# Patient Record
Sex: MALE | Race: WHITE | Employment: FULL TIME | URBAN - METROPOLITAN AREA
[De-identification: names, ages, dates, MRNs, and addresses within clinical notes are randomized per-mention and may not be internally consistent; named-entity substitution may affect disease eponyms.]

---

## 2021-05-09 ENCOUNTER — IMMUNIZATION (OUTPATIENT)
Dept: LAB | Facility: HOSPITAL | Age: 51
End: 2021-05-09
Attending: EMERGENCY MEDICINE
Payer: OTHER GOVERNMENT

## 2021-05-09 DIAGNOSIS — Z23 NEED FOR VACCINATION: Primary | ICD-10-CM

## 2021-05-09 PROCEDURE — 0001A SARSCOV2 VAC 30MCG/0.3ML IM: CPT

## 2021-05-30 ENCOUNTER — IMMUNIZATION (OUTPATIENT)
Dept: LAB | Facility: HOSPITAL | Age: 51
End: 2021-05-30
Attending: EMERGENCY MEDICINE
Payer: OTHER GOVERNMENT

## 2021-05-30 DIAGNOSIS — Z23 NEED FOR VACCINATION: Primary | ICD-10-CM

## 2021-05-30 PROCEDURE — 0002A SARSCOV2 VAC 30MCG/0.3ML IM: CPT

## 2022-01-03 ENCOUNTER — IMMUNIZATION (OUTPATIENT)
Dept: LAB | Facility: HOSPITAL | Age: 52
End: 2022-01-03
Attending: EMERGENCY MEDICINE
Payer: OTHER GOVERNMENT

## 2022-01-03 DIAGNOSIS — Z23 NEED FOR VACCINATION: Primary | ICD-10-CM

## 2022-01-03 PROCEDURE — 0004A SARSCOV2 VAC 30MCG/0.3ML IM: CPT

## 2023-01-05 ENCOUNTER — TELEPHONE (OUTPATIENT)
Dept: INTERNAL MEDICINE CLINIC | Facility: CLINIC | Age: 53
End: 2023-01-05

## 2023-01-05 ENCOUNTER — OFFICE VISIT (OUTPATIENT)
Dept: INTERNAL MEDICINE CLINIC | Facility: CLINIC | Age: 53
End: 2023-01-05
Payer: COMMERCIAL

## 2023-01-05 VITALS
DIASTOLIC BLOOD PRESSURE: 82 MMHG | BODY MASS INDEX: 36.57 KG/M2 | SYSTOLIC BLOOD PRESSURE: 122 MMHG | OXYGEN SATURATION: 98 % | WEIGHT: 233 LBS | RESPIRATION RATE: 16 BRPM | HEIGHT: 67 IN | TEMPERATURE: 98 F | HEART RATE: 98 BPM

## 2023-01-05 DIAGNOSIS — B35.1 TOENAIL FUNGUS: ICD-10-CM

## 2023-01-05 DIAGNOSIS — Z12.5 PROSTATE CANCER SCREENING: ICD-10-CM

## 2023-01-05 DIAGNOSIS — Z13.29 THYROID DISORDER SCREEN: ICD-10-CM

## 2023-01-05 DIAGNOSIS — Z00.00 LABORATORY EXAMINATION ORDERED AS PART OF A ROUTINE GENERAL MEDICAL EXAMINATION: ICD-10-CM

## 2023-01-05 DIAGNOSIS — R06.83 SNORING: ICD-10-CM

## 2023-01-05 DIAGNOSIS — Z00.00 ANNUAL PHYSICAL EXAM: Primary | ICD-10-CM

## 2023-01-05 DIAGNOSIS — Z13.220 LIPID SCREENING: ICD-10-CM

## 2023-01-05 DIAGNOSIS — Z12.11 COLON CANCER SCREENING: ICD-10-CM

## 2023-01-05 DIAGNOSIS — M25.561 CHRONIC PAIN OF RIGHT KNEE: ICD-10-CM

## 2023-01-05 DIAGNOSIS — G89.29 CHRONIC LEFT SHOULDER PAIN: ICD-10-CM

## 2023-01-05 DIAGNOSIS — L98.9 SKIN LESION: ICD-10-CM

## 2023-01-05 DIAGNOSIS — Z13.89 SCREENING FOR GENITOURINARY CONDITION: ICD-10-CM

## 2023-01-05 DIAGNOSIS — Z23 NEED FOR TDAP VACCINATION: ICD-10-CM

## 2023-01-05 DIAGNOSIS — Z13.0 SCREENING, IRON DEFICIENCY ANEMIA: ICD-10-CM

## 2023-01-05 DIAGNOSIS — G89.29 CHRONIC PAIN OF RIGHT KNEE: ICD-10-CM

## 2023-01-05 DIAGNOSIS — M25.512 CHRONIC LEFT SHOULDER PAIN: ICD-10-CM

## 2023-01-05 PROBLEM — Z86.16 PERSONAL HISTORY OF COVID-19: Status: ACTIVE | Noted: 2023-01-05

## 2023-01-05 PROBLEM — D17.1 LIPOMA OF SKIN OF ABDOMEN: Status: ACTIVE | Noted: 2023-01-05

## 2023-01-05 PROCEDURE — 90715 TDAP VACCINE 7 YRS/> IM: CPT | Performed by: INTERNAL MEDICINE

## 2023-01-05 PROCEDURE — 3079F DIAST BP 80-89 MM HG: CPT | Performed by: INTERNAL MEDICINE

## 2023-01-05 PROCEDURE — 99386 PREV VISIT NEW AGE 40-64: CPT | Performed by: INTERNAL MEDICINE

## 2023-01-05 PROCEDURE — 3008F BODY MASS INDEX DOCD: CPT | Performed by: INTERNAL MEDICINE

## 2023-01-05 PROCEDURE — 90471 IMMUNIZATION ADMIN: CPT | Performed by: INTERNAL MEDICINE

## 2023-01-05 PROCEDURE — 99213 OFFICE O/P EST LOW 20 MIN: CPT | Performed by: INTERNAL MEDICINE

## 2023-01-05 PROCEDURE — 3074F SYST BP LT 130 MM HG: CPT | Performed by: INTERNAL MEDICINE

## 2023-01-05 NOTE — TELEPHONE ENCOUNTER
Called pharmacy to confirm this med needs a PA. Out of pocket cost is $80. With good rx is $40. Called [facility] [employer] for PA contact information and spoke with Jayme Ramirez. She is faxing additional PA paperwork to be completed. Anne Vaughan [address]  Ph: [phone]   ID #: [id number]  Dx:  Toenail fungus B35.1

## 2023-01-06 ENCOUNTER — HOSPITAL ENCOUNTER (OUTPATIENT)
Dept: GENERAL RADIOLOGY | Age: 53
Discharge: HOME | End: 2023-01-06
Attending: INTERNAL MEDICINE
Payer: COMMERCIAL

## 2023-01-06 ENCOUNTER — LAB ENCOUNTER (OUTPATIENT)
Dept: LAB | Age: 53
End: 2023-01-06
Attending: INTERNAL MEDICINE
Payer: COMMERCIAL

## 2023-01-06 DIAGNOSIS — Z13.220 LIPID SCREENING: ICD-10-CM

## 2023-01-06 DIAGNOSIS — G89.29 CHRONIC PAIN OF RIGHT KNEE: ICD-10-CM

## 2023-01-06 DIAGNOSIS — M17.11 OSTEOARTHRITIS OF RIGHT KNEE, UNSPECIFIED OSTEOARTHRITIS TYPE: Primary | ICD-10-CM

## 2023-01-06 DIAGNOSIS — Z13.29 THYROID DISORDER SCREEN: ICD-10-CM

## 2023-01-06 DIAGNOSIS — Z13.89 SCREENING FOR GENITOURINARY CONDITION: ICD-10-CM

## 2023-01-06 DIAGNOSIS — Z12.5 PROSTATE CANCER SCREENING: ICD-10-CM

## 2023-01-06 DIAGNOSIS — M25.561 CHRONIC PAIN OF RIGHT KNEE: ICD-10-CM

## 2023-01-06 DIAGNOSIS — R93.6 ABNORMAL X-RAY OF KNEE: ICD-10-CM

## 2023-01-06 DIAGNOSIS — Z13.0 SCREENING, IRON DEFICIENCY ANEMIA: ICD-10-CM

## 2023-01-06 DIAGNOSIS — Z00.00 LABORATORY EXAMINATION ORDERED AS PART OF A ROUTINE GENERAL MEDICAL EXAMINATION: ICD-10-CM

## 2023-01-06 PROBLEM — Z91.89 10 YEAR RISK OF MI OR STROKE < 7.5%: Status: ACTIVE | Noted: 2023-01-06

## 2023-01-06 LAB
ALBUMIN SERPL-MCNC: 3.7 G/DL (ref 3.4–5)
ALBUMIN/GLOB SERPL: 1.2 {RATIO} (ref 1–2)
ALP LIVER SERPL-CCNC: 58 U/L
ALT SERPL-CCNC: 63 U/L
ANION GAP SERPL CALC-SCNC: 4 MMOL/L (ref 0–18)
AST SERPL-CCNC: 32 U/L (ref 15–37)
BASOPHILS # BLD AUTO: 0.05 X10(3) UL (ref 0–0.2)
BASOPHILS NFR BLD AUTO: 0.8 %
BILIRUB SERPL-MCNC: 1.4 MG/DL (ref 0.1–2)
BILIRUB UR QL STRIP.AUTO: NEGATIVE
BUN BLD-MCNC: 18 MG/DL (ref 7–18)
CALCIUM BLD-MCNC: 9.5 MG/DL (ref 8.5–10.1)
CHLORIDE SERPL-SCNC: 107 MMOL/L (ref 98–112)
CHOLEST SERPL-MCNC: 174 MG/DL (ref ?–200)
CLARITY UR REFRACT.AUTO: CLEAR
CO2 SERPL-SCNC: 29 MMOL/L (ref 21–32)
COLOR UR AUTO: YELLOW
COMPLEXED PSA SERPL-MCNC: 0.64 NG/ML (ref ?–4)
CREAT BLD-MCNC: 1.16 MG/DL
EOSINOPHIL # BLD AUTO: 0.28 X10(3) UL (ref 0–0.7)
EOSINOPHIL NFR BLD AUTO: 4.6 %
ERYTHROCYTE [DISTWIDTH] IN BLOOD BY AUTOMATED COUNT: 12.4 %
EST. AVERAGE GLUCOSE BLD GHB EST-MCNC: 88 MG/DL (ref 68–126)
FASTING PATIENT LIPID ANSWER: YES
FASTING STATUS PATIENT QL REPORTED: YES
GFR SERPLBLD BASED ON 1.73 SQ M-ARVRAT: 76 ML/MIN/1.73M2 (ref 60–?)
GLOBULIN PLAS-MCNC: 3.1 G/DL (ref 2.8–4.4)
GLUCOSE BLD-MCNC: 85 MG/DL (ref 70–99)
GLUCOSE UR STRIP.AUTO-MCNC: NEGATIVE MG/DL
HBA1C MFR BLD: 4.7 % (ref ?–5.7)
HCT VFR BLD AUTO: 46.2 %
HDLC SERPL-MCNC: 41 MG/DL (ref 40–59)
HGB BLD-MCNC: 16.7 G/DL
IMM GRANULOCYTES # BLD AUTO: 0.02 X10(3) UL (ref 0–1)
IMM GRANULOCYTES NFR BLD: 0.3 %
KETONES UR STRIP.AUTO-MCNC: NEGATIVE MG/DL
LDLC SERPL CALC-MCNC: 107 MG/DL (ref ?–100)
LEUKOCYTE ESTERASE UR QL STRIP.AUTO: NEGATIVE
LYMPHOCYTES # BLD AUTO: 2.19 X10(3) UL (ref 1–4)
LYMPHOCYTES NFR BLD AUTO: 36.1 %
MCH RBC QN AUTO: 33.9 PG (ref 26–34)
MCHC RBC AUTO-ENTMCNC: 36.1 G/DL (ref 31–37)
MCV RBC AUTO: 93.7 FL
MONOCYTES # BLD AUTO: 0.5 X10(3) UL (ref 0.1–1)
MONOCYTES NFR BLD AUTO: 8.3 %
NEUTROPHILS # BLD AUTO: 3.02 X10 (3) UL (ref 1.5–7.7)
NEUTROPHILS # BLD AUTO: 3.02 X10(3) UL (ref 1.5–7.7)
NEUTROPHILS NFR BLD AUTO: 49.9 %
NITRITE UR QL STRIP.AUTO: NEGATIVE
NONHDLC SERPL-MCNC: 133 MG/DL (ref ?–130)
OSMOLALITY SERPL CALC.SUM OF ELEC: 291 MOSM/KG (ref 275–295)
PH UR STRIP.AUTO: 5 [PH] (ref 5–8)
PLATELET # BLD AUTO: 199 10(3)UL (ref 150–450)
POTASSIUM SERPL-SCNC: 3.9 MMOL/L (ref 3.5–5.1)
PROT SERPL-MCNC: 6.8 G/DL (ref 6.4–8.2)
PROT UR STRIP.AUTO-MCNC: NEGATIVE MG/DL
RBC # BLD AUTO: 4.93 X10(6)UL
RBC UR QL AUTO: NEGATIVE
SODIUM SERPL-SCNC: 140 MMOL/L (ref 136–145)
SP GR UR STRIP.AUTO: 1.02 (ref 1–1.03)
TRIGL SERPL-MCNC: 147 MG/DL (ref 30–149)
TSI SER-ACNC: 3.37 MIU/ML (ref 0.36–3.74)
UROBILINOGEN UR STRIP.AUTO-MCNC: <2 MG/DL
VLDLC SERPL CALC-MCNC: 25 MG/DL (ref 0–30)
WBC # BLD AUTO: 6.1 X10(3) UL (ref 4–11)

## 2023-01-06 PROCEDURE — 85025 COMPLETE CBC W/AUTO DIFF WBC: CPT

## 2023-01-06 PROCEDURE — 73560 X-RAY EXAM OF KNEE 1 OR 2: CPT | Performed by: INTERNAL MEDICINE

## 2023-01-06 PROCEDURE — 80061 LIPID PANEL: CPT

## 2023-01-06 PROCEDURE — 81003 URINALYSIS AUTO W/O SCOPE: CPT

## 2023-01-06 PROCEDURE — 83036 HEMOGLOBIN GLYCOSYLATED A1C: CPT

## 2023-01-06 PROCEDURE — 80053 COMPREHEN METABOLIC PANEL: CPT

## 2023-01-06 PROCEDURE — 36415 COLL VENOUS BLD VENIPUNCTURE: CPT

## 2023-01-06 PROCEDURE — 84443 ASSAY THYROID STIM HORMONE: CPT

## 2023-01-09 ENCOUNTER — PATIENT MESSAGE (OUTPATIENT)
Dept: INTERNAL MEDICINE CLINIC | Facility: CLINIC | Age: 53
End: 2023-01-09

## 2023-01-09 DIAGNOSIS — B35.1 TOENAIL FUNGUS: ICD-10-CM

## 2023-01-09 NOTE — TELEPHONE ENCOUNTER
Prior authorization denied. Ciclopirox (PENLAC) 8 % External Solution    Patient notified of denial. Informed patient of Good rx coupon.

## 2023-02-06 ENCOUNTER — TELEPHONE (OUTPATIENT)
Dept: ORTHOPEDICS CLINIC | Facility: CLINIC | Age: 53
End: 2023-02-06

## 2023-02-06 DIAGNOSIS — M25.561 RIGHT KNEE PAIN, UNSPECIFIED CHRONICITY: Primary | ICD-10-CM

## 2023-02-06 NOTE — TELEPHONE ENCOUNTER
Patient is coming in for Rt knee pain. Patient had imaging done,Imaging can be viewed in Epic. Please review imaging, and if further imaging is needed please place Rx .   Future Appointments   Date Time Provider Benny Rae   3/6/2023  8:00 AM Thad Samaniego MD [facility]   4/18/2023  2:30 PM Elis Rodrigues MD [facility] SUB GI

## 2023-02-07 NOTE — TELEPHONE ENCOUNTER
Appointment moved sooner as with Teresa FOREMAN as appointment is scheduled 1 month away.   New WB images ordered per Teresa Escobar

## 2023-02-09 NOTE — TELEPHONE ENCOUNTER
XR scheduled. Sent patient message via [facility] to inform them and ask them to arrive 15-20 minutes prior to appointment with Kana Navarro.

## 2023-02-14 ENCOUNTER — OFFICE VISIT (OUTPATIENT)
Dept: ORTHOPEDICS CLINIC | Facility: CLINIC | Age: 53
End: 2023-02-14
Payer: COMMERCIAL

## 2023-02-14 VITALS — HEART RATE: 73 BPM | WEIGHT: 233 LBS | BODY MASS INDEX: 36.57 KG/M2 | OXYGEN SATURATION: 97 % | HEIGHT: 67 IN

## 2023-02-14 DIAGNOSIS — M17.11 PRIMARY OSTEOARTHRITIS OF RIGHT KNEE: ICD-10-CM

## 2023-02-14 DIAGNOSIS — M25.561 RIGHT KNEE PAIN, UNSPECIFIED CHRONICITY: Primary | ICD-10-CM

## 2023-02-14 PROCEDURE — 99203 OFFICE O/P NEW LOW 30 MIN: CPT | Performed by: PHYSICIAN ASSISTANT

## 2023-02-14 PROCEDURE — 20610 DRAIN/INJ JOINT/BURSA W/O US: CPT | Performed by: PHYSICIAN ASSISTANT

## 2023-02-14 PROCEDURE — 3008F BODY MASS INDEX DOCD: CPT | Performed by: PHYSICIAN ASSISTANT

## 2023-02-14 RX ORDER — TRIAMCINOLONE ACETONIDE 40 MG/ML
40 INJECTION, SUSPENSION INTRA-ARTICULAR; INTRAMUSCULAR ONCE
Status: COMPLETED | OUTPATIENT
Start: 2023-02-14 | End: 2023-02-14

## 2023-02-14 RX ADMIN — TRIAMCINOLONE ACETONIDE 40 MG: 40 INJECTION, SUSPENSION INTRA-ARTICULAR; INTRAMUSCULAR at 09:43:00

## 2023-02-14 NOTE — PROCEDURES
After informed consent, the patient's right knee was marked, locally anesthetized with skin refrigerant, prepped with topical antiseptic, and injected with a mixture of 1mL 40mg/mL Kenalog, 2mL 1% lidocaine and 2mL 0.5% marcaine through the inferolateral portal.  A band-aid was applied. The patient tolerated the procedure well.     Gael Amin PA-C  [address] Orthopedic Surgery

## 2023-04-18 PROBLEM — D12.0 BENIGN NEOPLASM OF CECUM: Status: ACTIVE | Noted: 2023-04-18

## 2023-04-18 PROBLEM — Z12.11 SPECIAL SCREENING FOR MALIGNANT NEOPLASM OF COLON: Status: ACTIVE | Noted: 2023-04-18

## 2023-04-18 PROBLEM — D12.3 BENIGN NEOPLASM OF TRANSVERSE COLON: Status: ACTIVE | Noted: 2023-04-18

## 2023-06-21 DIAGNOSIS — B35.1 TOENAIL FUNGUS: ICD-10-CM

## 2023-06-22 NOTE — TELEPHONE ENCOUNTER
Last time medication was refilled 1/9/23  Quantity and # of refills 1 each 2 ordered  Last OV 1/5/23  Next OV no appt scheduled.     Sent to Dr. Luz Marina Acosta for approval.

## 2024-04-02 ENCOUNTER — OFFICE VISIT (OUTPATIENT)
Dept: INTERNAL MEDICINE CLINIC | Facility: CLINIC | Age: 54
End: 2024-04-02
Payer: COMMERCIAL

## 2024-04-02 ENCOUNTER — LAB ENCOUNTER (OUTPATIENT)
Dept: LAB | Age: 54
End: 2024-04-02
Attending: INTERNAL MEDICINE
Payer: COMMERCIAL

## 2024-04-02 VITALS
HEIGHT: 67 IN | OXYGEN SATURATION: 98 % | BODY MASS INDEX: 36.1 KG/M2 | SYSTOLIC BLOOD PRESSURE: 128 MMHG | HEART RATE: 78 BPM | TEMPERATURE: 98 F | DIASTOLIC BLOOD PRESSURE: 72 MMHG | RESPIRATION RATE: 16 BRPM | WEIGHT: 230 LBS

## 2024-04-02 DIAGNOSIS — M54.16 LUMBAR RADICULAR PAIN: ICD-10-CM

## 2024-04-02 DIAGNOSIS — Z00.00 ANNUAL PHYSICAL EXAM: Primary | ICD-10-CM

## 2024-04-02 DIAGNOSIS — Z00.00 ROUTINE GENERAL MEDICAL EXAMINATION AT A HEALTH CARE FACILITY: ICD-10-CM

## 2024-04-02 DIAGNOSIS — Z12.5 PROSTATE CANCER SCREENING: ICD-10-CM

## 2024-04-02 PROBLEM — D12.3 BENIGN NEOPLASM OF TRANSVERSE COLON: Status: RESOLVED | Noted: 2023-04-18 | Resolved: 2024-04-02

## 2024-04-02 PROBLEM — D12.0 BENIGN NEOPLASM OF CECUM: Status: RESOLVED | Noted: 2023-04-18 | Resolved: 2024-04-02

## 2024-04-02 PROBLEM — Z12.11 SPECIAL SCREENING FOR MALIGNANT NEOPLASM OF COLON: Status: RESOLVED | Noted: 2023-04-18 | Resolved: 2024-04-02

## 2024-04-02 LAB
ALBUMIN SERPL-MCNC: 4 G/DL (ref 3.4–5)
ALBUMIN/GLOB SERPL: 1.3 {RATIO} (ref 1–2)
ALP LIVER SERPL-CCNC: 57 U/L
ALT SERPL-CCNC: 61 U/L
ANION GAP SERPL CALC-SCNC: 5 MMOL/L (ref 0–18)
AST SERPL-CCNC: 31 U/L (ref 15–37)
BASOPHILS # BLD AUTO: 0.05 X10(3) UL (ref 0–0.2)
BASOPHILS NFR BLD AUTO: 0.8 %
BILIRUB SERPL-MCNC: 1.2 MG/DL (ref 0.1–2)
BILIRUB UR QL STRIP.AUTO: NEGATIVE
BUN BLD-MCNC: 16 MG/DL (ref 9–23)
CALCIUM BLD-MCNC: 9.7 MG/DL (ref 8.5–10.1)
CHLORIDE SERPL-SCNC: 106 MMOL/L (ref 98–112)
CHOLEST SERPL-MCNC: 204 MG/DL (ref ?–200)
CLARITY UR REFRACT.AUTO: CLEAR
CO2 SERPL-SCNC: 29 MMOL/L (ref 21–32)
COMPLEXED PSA SERPL-MCNC: 0.57 NG/ML (ref ?–4)
CREAT BLD-MCNC: 1.18 MG/DL
EGFRCR SERPLBLD CKD-EPI 2021: 74 ML/MIN/1.73M2 (ref 60–?)
EOSINOPHIL # BLD AUTO: 0.3 X10(3) UL (ref 0–0.7)
EOSINOPHIL NFR BLD AUTO: 4.9 %
ERYTHROCYTE [DISTWIDTH] IN BLOOD BY AUTOMATED COUNT: 12.2 %
FASTING PATIENT LIPID ANSWER: YES
FASTING STATUS PATIENT QL REPORTED: YES
GLOBULIN PLAS-MCNC: 3.2 G/DL (ref 2.8–4.4)
GLUCOSE BLD-MCNC: 98 MG/DL (ref 70–99)
GLUCOSE UR STRIP.AUTO-MCNC: NORMAL MG/DL
HCT VFR BLD AUTO: 47.7 %
HDLC SERPL-MCNC: 46 MG/DL (ref 40–59)
HGB BLD-MCNC: 17 G/DL
IMM GRANULOCYTES # BLD AUTO: 0.02 X10(3) UL (ref 0–1)
IMM GRANULOCYTES NFR BLD: 0.3 %
KETONES UR STRIP.AUTO-MCNC: NEGATIVE MG/DL
LDLC SERPL CALC-MCNC: 109 MG/DL (ref ?–100)
LEUKOCYTE ESTERASE UR QL STRIP.AUTO: NEGATIVE
LYMPHOCYTES # BLD AUTO: 2.08 X10(3) UL (ref 1–4)
LYMPHOCYTES NFR BLD AUTO: 33.7 %
MCH RBC QN AUTO: 32.5 PG (ref 26–34)
MCHC RBC AUTO-ENTMCNC: 35.6 G/DL (ref 31–37)
MCV RBC AUTO: 91.2 FL
MONOCYTES # BLD AUTO: 0.45 X10(3) UL (ref 0.1–1)
MONOCYTES NFR BLD AUTO: 7.3 %
NEUTROPHILS # BLD AUTO: 3.27 X10 (3) UL (ref 1.5–7.7)
NEUTROPHILS # BLD AUTO: 3.27 X10(3) UL (ref 1.5–7.7)
NEUTROPHILS NFR BLD AUTO: 53 %
NITRITE UR QL STRIP.AUTO: NEGATIVE
NONHDLC SERPL-MCNC: 158 MG/DL (ref ?–130)
OSMOLALITY SERPL CALC.SUM OF ELEC: 291 MOSM/KG (ref 275–295)
PH UR STRIP.AUTO: 5.5 [PH] (ref 5–8)
PLATELET # BLD AUTO: 201 10(3)UL (ref 150–450)
POTASSIUM SERPL-SCNC: 4.5 MMOL/L (ref 3.5–5.1)
PROT SERPL-MCNC: 7.2 G/DL (ref 6.4–8.2)
PROT UR STRIP.AUTO-MCNC: NEGATIVE MG/DL
RBC # BLD AUTO: 5.23 X10(6)UL
RBC UR QL AUTO: NEGATIVE
SODIUM SERPL-SCNC: 140 MMOL/L (ref 136–145)
SP GR UR STRIP.AUTO: 1.01 (ref 1–1.03)
TRIGL SERPL-MCNC: 287 MG/DL (ref 30–149)
TSI SER-ACNC: 3.71 MIU/ML (ref 0.36–3.74)
UROBILINOGEN UR STRIP.AUTO-MCNC: NORMAL MG/DL
VLDLC SERPL CALC-MCNC: 50 MG/DL (ref 0–30)
WBC # BLD AUTO: 6.2 X10(3) UL (ref 4–11)

## 2024-04-02 PROCEDURE — 84443 ASSAY THYROID STIM HORMONE: CPT

## 2024-04-02 PROCEDURE — 36415 COLL VENOUS BLD VENIPUNCTURE: CPT

## 2024-04-02 PROCEDURE — 80053 COMPREHEN METABOLIC PANEL: CPT

## 2024-04-02 PROCEDURE — 99396 PREV VISIT EST AGE 40-64: CPT | Performed by: INTERNAL MEDICINE

## 2024-04-02 PROCEDURE — 80061 LIPID PANEL: CPT

## 2024-04-02 PROCEDURE — 85025 COMPLETE CBC W/AUTO DIFF WBC: CPT

## 2024-04-02 PROCEDURE — 81003 URINALYSIS AUTO W/O SCOPE: CPT

## 2024-04-02 PROCEDURE — 99213 OFFICE O/P EST LOW 20 MIN: CPT | Performed by: INTERNAL MEDICINE

## 2024-04-02 NOTE — PROGRESS NOTES
Subjective:   Patient ID: Freddy Ontiveros is a 53 year old male.    HPI  Freddy Ontiveros is a 53 year old male who presents for a complete physical exam.   HPI:   Pt complains of lumbar pain with radiation to R>L leg.  Denies incontinence.  Pain is worsening  Has undergone PT and chiro care w/o relief. Has had 6 session.  Sxs occurred >6 m ago after heavy back squats      PAST MEDICAL, SOCIAL, FAMILY HISTORIES REVIEWED WITH PT  .    Immunization History   Administered Date(s) Administered    Covid-19 Vaccine Pfizer 30 mcg/0.3 ml 05/09/2021, 05/30/2021, 01/03/2022    TDAP 01/05/2023     Wt Readings from Last 6 Encounters:   04/02/24 230 lb (104.3 kg)   04/05/23 220 lb (99.8 kg)   02/14/23 233 lb (105.7 kg)   01/05/23 233 lb (105.7 kg)     Body mass index is 36.02 kg/m².     Lab Results   Component Value Date    GLU 85 01/06/2023    GLU 74 01/09/2014     Lab Results   Component Value Date    CHOLEST 174 01/06/2023    CHOLEST 180 01/09/2014     Lab Results   Component Value Date    HDL 41 01/06/2023    HDL 40 (L) 01/09/2014     Lab Results   Component Value Date     (H) 01/06/2023     01/09/2014     Lab Results   Component Value Date    AST 32 01/06/2023    AST 22 01/09/2014     Lab Results   Component Value Date    ALT 63 (H) 01/06/2023    ALT 42 01/09/2014     Lab Results   Component Value Date    PSA 0.472 01/09/2014        Current Outpatient Medications   Medication Sig Dispense Refill    Ciclopirox (PENLAC) 8 % External Solution Apply 1 Application. topically nightly. 1 each 1      Past Medical History:   Diagnosis Date    Arthritis     Wears glasses       Past Surgical History:   Procedure Laterality Date    APPENDECTOMY      TONSILLECTOMY        Family History   Problem Relation Age of Onset    Colon Polyps Father     Prostate Cancer Father     Diabetes Brother       Social History:  Social History     Socioeconomic History    Marital status:    Tobacco Use    Smoking status: Never     Smokeless tobacco: Former     Quit date: 2021   Vaping Use    Vaping Use: Never used   Substance and Sexual Activity    Alcohol use: Yes     Alcohol/week: 10.0 standard drinks of alcohol     Types: 2 Glasses of wine, 4 Cans of beer, 4 Standard drinks or equivalent per week    Drug use: Not Currently      Occ: yes. : yes. Children: yes.   Exercise: once per week,  twice per week.  Diet: watches minimally     REVIEW OF SYSTEMS:   A comprehensive 10 point review of systems was completed.     Pertinent positives and negatives noted in the HPI.      EXAM:   /72   Pulse 78   Temp 97.6 °F (36.4 °C)   Resp 16   Ht 5' 7\" (1.702 m)   Wt 230 lb (104.3 kg)   SpO2 98%   BMI 36.02 kg/m²   Body mass index is 36.02 kg/m².   GENERAL: well developed, well nourished,in no apparent distress  SKIN: no rashes,no suspicious lesions  HEENT: atraumatic, normocephalic,ears and throat are clear  EYES:PERRLA, EOMI,,conjunctiva are clear  NECK: supple,no adenopathy,no bruits  LUNGS: clear to auscultation  CARDIO: RRR without murmur  GI: good BS's,no masses, HSM or tenderness  : deferred  MUSCULOSKELETAL: back is not tender  EXTREMITIES: no cyanosis, clubbing or edema  NEURO: Oriented times three,motor and sensory are grossly intact, right patellar DTR 1+ otherwise remaining DTR LE are 2+    ASSESSMENT AND PLAN:   Freddy Ontiveros is a 53 year old male who presents for a complete physical exam. Body mass index is 36.02 kg/m²., recommended low fat diet and aerobic exercise 30 minutes three times weekly.   Health maintenance, will check fasting Lipids, CMP, CBC and PSA.  Check lumbar MRI for eval of lumbar radiculopathy  Utd wit screening colonoscopy.   Pt info handouts given for: exercise, low fat diet,   The patient indicates understanding of these issues and agrees to the plan.  The patient is asked to return for CPX in 12 m.    History/Other:   Review of Systems  Current Outpatient Medications   Medication Sig Dispense  Refill    Ciclopirox (PENLAC) 8 % External Solution Apply 1 Application. topically nightly. 1 each 1     Allergies:No Known Allergies    Objective:   Physical Exam    Assessment & Plan:   1. Annual physical exam    2. Routine general medical examination at a health care facility    3. Lumbar radicular pain    4. Prostate cancer screening        Orders Placed This Encounter   Procedures    CBC With Differential With Platelet    Comp Metabolic Panel (14)    Lipid Panel    TSH W Reflex To Free T4    Urinalysis, Routine    PSA Total, Screen       Meds This Visit:  Requested Prescriptions      No prescriptions requested or ordered in this encounter       Imaging & Referrals:  MRI SPINE LUMBAR (CPT=72148)

## 2024-04-25 ENCOUNTER — TELEPHONE (OUTPATIENT)
Dept: INTERNAL MEDICINE CLINIC | Facility: CLINIC | Age: 54
End: 2024-04-25

## 2024-04-25 NOTE — TELEPHONE ENCOUNTER
Spoke with pt, notified referral team will get prior auth approved  Advised to keep an eye on it and let us know couple days before if still not authorized  Pt v/u

## 2024-05-07 ENCOUNTER — HOSPITAL ENCOUNTER (OUTPATIENT)
Dept: MRI IMAGING | Facility: HOSPITAL | Age: 54
Discharge: HOME | End: 2024-05-07
Attending: INTERNAL MEDICINE
Payer: COMMERCIAL

## 2024-05-07 DIAGNOSIS — M54.16 LUMBAR RADICULAR PAIN: ICD-10-CM

## 2024-05-07 PROCEDURE — 72148 MRI LUMBAR SPINE W/O DYE: CPT | Performed by: INTERNAL MEDICINE

## 2024-05-15 ENCOUNTER — OFFICE VISIT (OUTPATIENT)
Dept: PAIN CLINIC | Facility: CLINIC | Age: 54
End: 2024-05-15

## 2024-05-15 ENCOUNTER — TELEPHONE (OUTPATIENT)
Dept: PAIN CLINIC | Facility: CLINIC | Age: 54
End: 2024-05-15

## 2024-05-15 VITALS — HEART RATE: 73 BPM | SYSTOLIC BLOOD PRESSURE: 118 MMHG | DIASTOLIC BLOOD PRESSURE: 86 MMHG | OXYGEN SATURATION: 98 %

## 2024-05-15 DIAGNOSIS — M48.062 SPINAL STENOSIS OF LUMBAR REGION WITH NEUROGENIC CLAUDICATION: Primary | ICD-10-CM

## 2024-05-15 DIAGNOSIS — M54.16 LUMBAR RADICULITIS: Primary | ICD-10-CM

## 2024-05-15 PROCEDURE — 99204 OFFICE O/P NEW MOD 45 MIN: CPT | Performed by: PHYSICIAN ASSISTANT

## 2024-05-15 NOTE — PROGRESS NOTES
Location of Pain: lumbar back radiating down B LE, R>L     Date Pain Began: 7/24          Work Related:   No        Receiving Work Comp/Disability:   No    Numeric Rating Scale:  Pain at Present:  2                                                                                                            (No Pain) 0  to  10 (Worst Pain)                 Minimum Pain:   2  Maximum Pain  8    Distribution of Pain:    bilateral    Quality of Pain:    Cramping    Origin of Pain:    Traumatic Accident    Aggravating Factors:    Other Extended Activity    Past Treatments for Current Pain Condition:   Physical Therapy and Other Chiro    Prior diagnostic testing for your pain:  MRI

## 2024-05-15 NOTE — PROGRESS NOTES
Patient: Freddy Ontiveros  Medical Record Number: [id number]  Site: [facility]  Referring Physician:  Riki Bourgeois  PCP: Same    Dear Dr. Bourgeois:    Thank you very much for requesting this consultation. I had the opportunity to evaluate and initiate care for your patient today, as per your request.    HISTORY OF CHIEF COMPLAINT:      Freddy Ontiveros is a 54 year old male, who complains of low back and bilateral lower extremity pain.    Patient is here today with pain in above-described distribution that is been ongoing since 7/2023.  States that he had been working out (engaging in squats) and had acute onset of pain.  States that he has pursued conservative management, to include physical therapy with focus on HEP, as well as chiropractic care.  This was partially effective, though pain has certainly continued.  He has used NSAIDs, to temporary relief.  Has been evaluated by his primary care physician, who sent him for MRI of the lumbar spine.  This did reveal moderate stenosis (see below) and was sent for consideration of further options.    VAS Pain Score:  2-8/10    Aggravating Factors: Relieving Factors:   Standing in one position  Extension  Limiting activity      Past Treatment Attempted/Patient’s Response:  As above     Past Medical History:    Arthritis    Wears glasses      Past Surgical History:   Procedure Laterality Date    Appendectomy      Tonsillectomy        Family History   Problem Relation Age of Onset    Colon Polyps Father     Prostate Cancer Father     Diabetes Brother       Social History     Socioeconomic History    Marital status:    Tobacco Use    Smoking status: Never    Smokeless tobacco: Former     Quit date: 2021   Vaping Use    Vaping status: Never Used   Substance and Sexual Activity    Alcohol use: Yes     Alcohol/week: 10.0 standard drinks of alcohol     Types: 2 Glasses of wine, 4 Cans of beer, 4 Standard drinks or equivalent per week    Drug use: Not  Currently   Other Topics Concern    Caffeine Concern No    Exercise No      Current Medications:  Current Outpatient Medications   Medication Sig Dispense Refill    Ciclopirox (PENLAC) 8 % External Solution Apply 1 Application. topically nightly. 1 each 1        Functional Assessment: Patient reports that they are able to complete all of their ADL's such as eating, bathing, using the toilet, dressing and getting up from a bed or a chair independently.    Work History:  The patient currently works full time as .       REVIEW OF SYSTEMS:   10 point review of systems is otherwise negative,unless otherwise in HPI.      Radiology/Lab Test Reviewed:  MRI L spine:    T12-L1: There is no significant abnormality.      L1-2:  Minimal diffuse disc bulge with mild facet arthropathy. There is no significant spinal canal or neural foraminal stenosis.      L2-3:  Minimal diffuse disc bulge with mild facet arthropathy.  Mild spinal canal stenosis.  No significant neural foraminal stenosis.      L3-4:  Diffuse disc bulge with endplate osteophytes.  Mild facet arthropathy.  Mild to moderate spinal canal stenosis.  Mild bilateral neural foraminal stenosis.      L4-5:  Mild diffuse disc bulge.  Mild-to-moderate facet arthropathy with thickening of ligamentum flavum.  Small facet joint effusions.  Mild to moderate spinal canal stenosis.  Mild bilateral neural foraminal stenosis.      L5-S1:  Mild facet arthropathy. There is no significant spinal canal or neural foraminal stenosis.         CBC:    Lab Results   Component Value Date    WBC 6.2 04/02/2024    WBC 6.1 01/06/2023   No results found for: \"HEMOGLOBIN\"  Lab Results   Component Value Date    .0 04/02/2024    .0 01/06/2023       PHYSICAL EXAMIMATION   PHYSICAL EXAMINATION: Freddy Ontiveros is a 54 year old male who is observed sitting comfortably on a chair in the exam room alert and oriented times three. He looks consistent with his stated age.    Ht  Readings from Last 1 Encounters:   04/02/24 67\"     Wt Readings from Last 1 Encounters:   04/02/24 230 lb (104.3 kg)     The patient is well developed, well nourished, well muscled, obese. He moves independently from sitting to standing with ease.       Inspection:  No acute distress    Patient displays Non-antalgic gait.    Coordination:  Well-coordinated, fluent gait, able to engage in rapid alternating movements of upper and lower extremities.    ROM Lumbar Spine:  See chart below:  Motion Right (+ or -) Left (+ or -)   Lumbar Flexion - -   Lumbar Extension + +   Lumbar Bending - -   Lumbar Extension/ twisting motion - -     Lumbar/Sacral Integument:  Skin over lumbar sacral spine is intact without rashes, excoriations, lesions or erythema noted    Palpation:  See chart below:  Palpation of lumbar area Right (+ or -) Left (+ or -)   Lumbar facets - -   Lumbar paraspinals - -   Piriformis - -   SIJ - -   Trochanteric Bursa - -     Deep Tendon Reflexes:  Grossly intact to bilateral lower extremities 2/4 throughout    Strength:  Strength of bilateral lower extremities grossly intact; 5/5 throughout    Sensation:  No sensory deficits noted on bilateral lower extremities to light touch    Tests:  Test Right (+ or -) Left (+ or -)   SLR - -   Rodrigo’s     Babinski     Clonus       HEAD/NECK: Head is normocephalic, neck supple  EYES: EOMI, NONI  SKIN EXAM: Skin is intact, head, neck, trunk and arms/legs. No rashes, mottling or ulcerations.  LYMPH EXAM: There is no lymph edema in either lower extremity.  VASCULAR EXAM: Pedal pulses are normal bilaterally, with good distal perfusion. No clubbing or cyanosis.  ABDOMINAL EXAM: Abdomen is soft without masses palpated.  HEART: normal, regular, S1 and S2  LUNGS:CTA  MUSCULOSKELETAL: Smooth, pain-free ROM to bilat hips, ankles, and knees.     Do you have any known blood/bleeding disorders?  No  Does patient currently take blood thinners?   None  Does patient currently take  any antibiotics?   No      Patient is currently on pain medications:  No  Reason pain medications are prescribed: N/A  Pain medications are prescribed by: N/A  [place] Prescription Monitoring review: N/A  DIRE: N/A  Treatment decision: N/A    MEDICAL DECISION MAKING:     Impression: Lumbar stenosis with neurogenic claudication    Low back and radiating bilateral lower extremity pain in the setting of MRI evidence moderate stenosis secondary to disc bulging and facet hypertrophy.  Symptoms progressively present for the past 10 months, and has had partial improvement with PT/HEP, chiropractic care, and NSAIDs.  Pain continues to impact his quality of life, and that it limits his activity tolerance, and was evaluated by his primary care physician, who sent him for consideration of injections.    On exam, pain reproduced with range of motion lumbar spine, specifically extension, no focal sensory/motor deficits.  Negative straight leg raise.    Will proceed with an intralaminar epidural steroid injection, risks and benefits of which were reviewed in detail.  Follow-up 2 to 3 weeks thereafter.    Plan: LESI, follow-up 2 to 3 weeks.    The patient indicates understanding of these issues and agrees to the plan.      Thank you very much.     Respectfully yours,    KELLEN Aguilar

## 2024-05-15 NOTE — PATIENT INSTRUCTIONS
Refill policies:    Allow 2-3 business days for refills; controlled substances may take longer.  Contact your pharmacy at least 5 days prior to running out of medication and have them send an electronic request or submit request through the “request refill” option in your [employer] account.  Refills are not addressed on weekends; covering physicians do not authorize routine medications on weekends.  No narcotics or controlled substances are refilled after noon on Fridays or by on call physicians.  By law, narcotics must be electronically prescribed.  A 30 day supply with no refills is the maximum allowed.  If your prescription is due for a refill, you may be due for a follow up appointment.  To best provide you care, patients receiving routine medications need to be seen at least once a year.  Patients receiving narcotic/controlled substance medications need to be seen at least once every 3 months.  In the event that your preferred pharmacy does not have the requested medication in stock (e.g. Backordered), it is your responsibility to find another pharmacy that has the requested medication available.  We will gladly send a new prescription to that pharmacy at your request.    Scheduling Tests:    If your physician has ordered radiology tests such as MRI or CT scans, please contact Central Scheduling at [phone] right away to schedule the test.  Once scheduled, the [facility] Centralized Referral Team will work with your insurance carrier to obtain pre-certification or prior authorization.  Depending on your insurance carrier, approval may take 3-10 days.  It is highly recommended patients assure they have received an authorization before having a test performed.  If test is done without insurance authorization, patient may be responsible for the entire amount billed.      Precertification and Prior Authorizations:  If your physician has recommended that you have a procedure or additional testing performed the [facility]  Centralized Referral Team will contact your insurance carrier to obtain pre-certification or prior authorization.    You are strongly encouraged to contact your insurance carrier to verify that your procedure/test has been approved and is a COVERED benefit.  Although the [facility] Centralized Referral Team does its due diligence, the insurance carrier gives the disclaimer that \"Although the procedure is authorized, this does not guarantee payment.\"    Ultimately the patient is responsible for payment.   Thank you for your understanding in this matter.  Paperwork Completion:  If you require FMLA or disability paperwork for your recovery, please make sure to either drop it off or have it faxed to our office at [phone]. Be sure the form has your name and date of birth on it.  The form will be faxed to our Forms Department and they will complete it for you.  There is a 25$ fee for all forms that need to be filled out.  Please be aware there is a 10-14 day turnaround time.  You will need to sign a release of information (FRANCISCA) form if your paperwork does not come with one.  You may call the Forms Department with any questions at [phone].  Their fax number is [phone].

## 2024-05-15 NOTE — PROGRESS NOTES
Patient presents in office today with reported pain in lumbar back radiating down B LE, R>L    Current pain level reported = 2/10    Last reported dose of NA today      Narcotic Contract renewal NA    Urine Drug screen NA

## 2024-05-15 NOTE — TELEPHONE ENCOUNTER
Prior authorization request completed for: [place]  Authorization # [id number]  Authorization dates: 5/15/24-11/11/24  CPT codes approved: 34402  Number of visits/dates of service approved: 1  Physician: jovan  Location: [facility]      Patient can be scheduled. Routed to Navigator.

## 2024-05-16 NOTE — TELEPHONE ENCOUNTER
Patient advised of insurance approval to proceed with injections and is agreeable to scheduling. Patient scheduled for procedure, pre-procedure instructions reviewed. Patient prefers Local sedation. Reviewed sedation instructions including No Fasting and No  Required. Patient encouraged but not required to hold Ibuprofen for 24 hours prior to procedure. Patient verbalized understanding of instructions, no further needs at this time.      [facility]  PRE-PROCEDURE INSTRUCTIONS WITHOUT SEDATION    Procedure: LESI       Appointment Date: 06/03/2024  Check-In Time: 07:00 AM    Follow-Up Date/Time w/KELLEN Flowers : 06/18/2024 @ 08:30 AM    Prior to the procedure:  Please update us prior to the procedure if you are experiencing any symptoms of infection such as cough, fever, chills, urinary symptoms, or have recently been prescribed antibiotics, have open wounds, have recently had surgery or dental procedures.    Day of Procedure:  **Drivers will be required for patients who receive prescriptions for Valium.    NO FASTING REQUIRED  Please bring your Insurance Card, Photo ID, List of Current Medications and Referral (if applicable) to your appointment.  Please park in the [employer]age and follow the signs to the [facility].  Check in at [facility] ([address] [place] [address]) outpatient registration in the [facility].  Please note-No prescriptions will be written by Pain Clinic in OR on the day of procedure. If you require a refill of medications, please contact the office 48 hours prior to your procedure.  If you have an implanted Spinal Cord or Peripheral Nerve Stimulator: Please remember to turn device off for procedure.        Medication Hold:    Number of days you need to be off for the following medications:    Aggrenox 10 days   Agrylin (Anagrelide) 10 days  Brilinta (Ticagrelor) 7 days  Imbruvica (Ibrutinib) 3 days   Enbrel (Etanercept) 24 hours   Fragmin  (Dalteparin) 24 hours   Pletal (Cilostazol) 7 days  Effient (Prasugrel) 7 days  Pradaxa 10 days  Trental 7 days  Eliquis (Apixaban) 3 days  Xarelto (Rivaroxaban) 3 days  Lovenox (Enoxaparin) 24 hours  Aspirin  Greater than 81mg but less than 325mg   5 days  325mg and greater                  7 days  Coumadin       5 days  Procedure may be cancelled if INR is elevated.   Excedrin (with aspirin) 7 days  Plavix (Clopidogrel)                            7 days    NSAIDs: 24 hours preferred      Ibuprofen (Motrin, Advil, Vicoprofen), Naproxen (Naprosyn, Aleve), Piroxcam (Feldene), Meloxicam (Mobic), Oxaprozin (Daypro), Diclofenac (Voltaren), Indomethacin (Indocin), Etodolac (Lodine), Nabumetone (Relafen), Celebrex (Celecoxib)           HERBAL SUPPLEMENTS  5 days preferred  Fish oil, krill oil, Omega-3, Vascepa, Vitamin E, Turmeric, Garlic                       Insurance Authorization:   Most insurances are now requiring a preauthorization for all procedures.  In the event that your insurance does not authorize your procedure within 48 hours of the scheduled date, your procedure will be cancelled and rescheduled to a later date.  Please contact your insurance carrier to determine what your financial responsibility will be for the procedure(s).      Cancellation/Rescheduling Appointment:   In the event you need to cancel or reschedule your appointment, you must notify the office 24 hours prior.    Post-procedure instructions:        Please schedule a follow up visit within 2 to 4 weeks after your last procedure date   Please call our office with any questions or concerns before or after your procedure at  [phone].  If you are a diabetic, please increase the frequency of your glucose monitoring after the procedure as this may cause a temporary increase in your blood sugar.  Contact your primary care physician if your blood sugar rises as you may require some medication adjustment.  It is normal to have increased pain at  injection site for up to 3-5 days after procedure, you can use heat or ice (20 minutes on 20 minutes off) for comfort.    **To hear a recorded version of these instructions, please call [phone] and follow the prompts.  **Para escuchar las instrucciones en Español, por favor de llamar el marshall [phone] opción 4.

## 2024-06-03 ENCOUNTER — HOSPITAL ENCOUNTER (OUTPATIENT)
Facility: HOSPITAL | Age: 54
Setting detail: HOSPITAL OUTPATIENT SURGERY
Discharge: HOME | End: 2024-06-03
Attending: ANESTHESIOLOGY | Admitting: ANESTHESIOLOGY
Payer: COMMERCIAL

## 2024-06-03 ENCOUNTER — APPOINTMENT (OUTPATIENT)
Dept: GENERAL RADIOLOGY | Facility: HOSPITAL | Age: 54
End: 2024-06-03
Attending: ANESTHESIOLOGY
Payer: COMMERCIAL

## 2024-06-03 VITALS
DIASTOLIC BLOOD PRESSURE: 89 MMHG | TEMPERATURE: 98 F | OXYGEN SATURATION: 98 % | RESPIRATION RATE: 18 BRPM | SYSTOLIC BLOOD PRESSURE: 156 MMHG | HEART RATE: 63 BPM

## 2024-06-03 PROCEDURE — 62323 NJX INTERLAMINAR LMBR/SAC: CPT | Performed by: ANESTHESIOLOGY

## 2024-06-03 PROCEDURE — 3E0R33Z INTRODUCTION OF ANTI-INFLAMMATORY INTO SPINAL CANAL, PERCUTANEOUS APPROACH: ICD-10-PCS | Performed by: ANESTHESIOLOGY

## 2024-06-03 RX ORDER — LIDOCAINE HYDROCHLORIDE 10 MG/ML
INJECTION, SOLUTION EPIDURAL; INFILTRATION; INTRACAUDAL; PERINEURAL
Status: DISCONTINUED | OUTPATIENT
Start: 2024-06-03 | End: 2024-06-03

## 2024-06-03 RX ORDER — DEXAMETHASONE SODIUM PHOSPHATE 10 MG/ML
INJECTION, SOLUTION INTRAMUSCULAR; INTRAVENOUS
Status: DISCONTINUED | OUTPATIENT
Start: 2024-06-03 | End: 2024-06-03

## 2024-06-03 RX ORDER — SODIUM CHLORIDE 9 MG/ML
INJECTION, SOLUTION INTRAMUSCULAR; INTRAVENOUS; SUBCUTANEOUS
Status: DISCONTINUED | OUTPATIENT
Start: 2024-06-03 | End: 2024-06-03

## 2024-06-03 NOTE — H&P
History & Physical Examination    Patient Name: Freddy Ontiveros  MRN: [id number]  CSN: [id number]  YOB: 1970    Pre-Operative Diagnosis:  Lumbar radiculitis [M54.16]    Present Illness: Lumbar radiculitis    ASA: 2  MP class: 1  Sedation: Local      Medications Prior to Admission   Medication Sig Dispense Refill Last Dose    Ciclopirox (PENLAC) 8 % External Solution Apply 1 Application. topically nightly. 1 each 1      No current facility-administered medications for this encounter.       Allergies:   Allergies   Allergen Reactions    Seasonal ITCHING       Past Medical History:    Arthritis    Wears glasses     Past Surgical History:   Procedure Laterality Date    Appendectomy      Tonsillectomy       Family History   Problem Relation Age of Onset    Colon Polyps Father     Prostate Cancer Father     Diabetes Brother      Social History     Tobacco Use    Smoking status: Never    Smokeless tobacco: Former     Quit date: 2021   Substance Use Topics    Alcohol use: Yes     Alcohol/week: 10.0 standard drinks of alcohol     Types: 2 Glasses of wine, 4 Cans of beer, 4 Standard drinks or equivalent per week       SYSTEM Check if Review is Normal Check if Physical Exam is Normal If not normal, please explain:   HEENT [x ] [x ]    NECK & BACK [x ] [x ]    HEART [x ] [x ]    LUNGS [x ] [x ]    ABDOMEN [x ] [x ]    UROGENITAL [x ] [x ]    EXTREMITIES [x ] [x ]    OTHER        [ x ] I have discussed the risks and benefits and alternatives with the patient/family.  They understand and agree to proceed with plan of care.  [ x ] I have reviewed the History and Physical done within the last 30 days.  Any changes noted above.    Josh Barrera MD

## 2024-06-03 NOTE — OPERATIVE REPORT
[facility]  Operative Report  6/3/2024     Freddy Ontiveros Patient Status:  Hospital Outpatient Surgery    1970 MRN [id number]   Location [facility] Attending Josh Barrera MD   Hosp Day # 0 PCP Riki Bourgeois MD     Indication: Freddy is a 54 year old male with lumbar radicular    Preoperative Diagnosis:  Lumbar radiculitis [M54.16]    Postoperative Diagnosis: Same as above.    Procedure performed: LUMBAR INTERLAMINAR EPIDURAL INJECTION with local    Anesthesia: Local      EBL: Less than 1 ml.    Procedure Description:  After reviewing the patient's history and performing a focused physical examination, the diagnosis and positive previous diagnostic tests were confirmed and contraindications such as infection and coagulopathy were ruled out.  Following review of allergies and potential side effects and complications, including but not necessarily limited to infection, allergic reaction, local tissue breakdown, nerve injury, post-dural puncture headache and paresis, the patient consented for the procedure.    The patient was brought to the procedure room in prone position.   After sterile prep of the lumbar spine, the L4-L5 interspace was identified with the help of fluoroscopy. Local anesthetic was given by a 25 gauge 1.5 inch needle with 1% lidocaine in that space level.  Thereafter, a 20 gauge Tuohy needle was introduced and advanced under fluoroscopy.  The epidural space was accessed by using loss of resistance to air technique.  The needle position was confirmed with AP and lateral view under fluoroscopy.  Omnipaque 180 was injected in 1 mL volume. A good epidurogram was obtained.  Thereafter, dexamethasone 10 mg with normal saline of 5 mL in total volume of 6 mL was injected through the Tuohy needle.  The needle was flushed with 1 mL lidocaine.  The needle was withdrawn with the stylet intact in situ.  The needle's tip was intact.  The patient tolerated the procedure very  well without significant immediate complication.  The patient's back was cleaned and sterile dressing was applied.  The patient was discharged with an instruction to a responsible adult after discharge criteria were met.  The patient was advised to contact us should any problems happen.  The patient was also informed to go to the emergency room immediately if experiencing severe numbness or weakness in the extremities or experiencing bowel or bladder incontinence.            Complications: None.    Follow up: The patient was followed in the pain clinic as needed basis.          Josh Barrera MD

## 2024-06-03 NOTE — DISCHARGE INSTRUCTIONS
Home Care Instructions Following Your Pain Procedure     Fredyd,  It has been a pleasure to have you as our patient. To help you at home, you must follow these general discharge instructions. We will review these with you before you are discharged. It is our hope that you have a complete and uneventful recovery from our procedure.     General Instructions:  What to Expect:  Bandages from your procedure today can be removed when you get home.  Please avoid soaking and/or swimming for 24 hours.  Showering is okay  It is normal to have increased pain symptoms and/or pain at injection site for up to 3-5 days after procedure, you can use heat or ice (20 minutes on 20 minutes off) for comfort.  You may experience some temporary side effects which may include restlessness or insomnia, flushing of the face, or heart palpitations.  Please contact the provider if these symptoms do not resolve within 3-4 days.  Lightheadedness or nausea may occur and should resolve within 24 to 48 hours.  If you develop a headache after treatment, rest, drink fluids (with caffeine, if possible) and take mild over-the-counter pain medication.  If the headache does not improve with the above treatment, contact the physician.  Home Medications:  Resume all previously prescribed medication.  Please avoid taking NSAIDs (Non-Steriodal Anti-Inflammatory Drugs) such as:  Ibuprofen ( Advil, Motrin) Aleve (Naproxen), Diclofenac, Meloxicam for 6 hours after procedure.   If you are on Coumadin (Warfarin) or any other anti-coagulant (or \"blood thinning\") medication such as Plavix (Clopidogrel), Xarelto (Rivaroxaban), Eliquis (Apixaban), Effient (Prasugrel) etc., restart on the following day from the procedure unless otherwise directed by your provider.  If you are a diabetic, please increase the frequency of your glucose monitoring after the procedure as steroids may cause a temporary (2-3 day) increase in your blood sugar.  Contact your primary care  physician if your blood sugar remains elevated as you may require some medication adjustment.  Diet:  Resume your regular diet as tolerated.  Activity:  We recommend that you relax and rest during the rest of your procedure day.  If you feel weakness in your arms or legs do not drive.  Follow-up Appointment  Please schedule a follow-up visit within 3 to 4 weeks after your last procedure date.  Question or Concerns:  Feel free to call our office with any questions or concerns at [phone] (option #2)    Freddy  Thank you for coming to [facility] for your procedure.  The nurses try very hard to make sure you receive the best care possible.  Your trust in them as well as us is greatly appreciated.    Thanks so much,   Dr. Josh Barrera

## 2024-06-04 ENCOUNTER — TELEPHONE (OUTPATIENT)
Dept: PAIN CLINIC | Facility: CLINIC | Age: 54
End: 2024-06-04

## 2024-06-04 NOTE — TELEPHONE ENCOUNTER
Ramila called placed to patient for post procedure follow up. Patient stated no questions nor concern.  Pt verbalized understanding to call with any questions or concerns.      Procedure: LESI  Date: 6/3/2024  Follow up Visit Scheduled: 6/18/2024 with Juanito Pringle

## 2024-06-18 ENCOUNTER — OFFICE VISIT (OUTPATIENT)
Dept: PAIN CLINIC | Facility: CLINIC | Age: 54
End: 2024-06-18

## 2024-06-18 VITALS — DIASTOLIC BLOOD PRESSURE: 82 MMHG | HEART RATE: 60 BPM | SYSTOLIC BLOOD PRESSURE: 118 MMHG | OXYGEN SATURATION: 97 %

## 2024-06-18 DIAGNOSIS — M48.062 SPINAL STENOSIS OF LUMBAR REGION WITH NEUROGENIC CLAUDICATION: Primary | ICD-10-CM

## 2024-06-18 PROCEDURE — 99214 OFFICE O/P EST MOD 30 MIN: CPT | Performed by: PHYSICIAN ASSISTANT

## 2024-06-18 NOTE — PROGRESS NOTES
HPI:   Freddy Ontiveros presents with complaints of low back and bilateral gluteal pain into the right posterior lateral thigh, no longer passing the knee.    The pain is described as mild aching that is intermittent.  The patient’s activity level has increased since last visit.  The pain is worst unrelated to time of day.    Changes in condition/history since last visit: pt is here today for follow up after LESI #1 on 6/3/24.  Procedure was well tolerated, and had no adverse effects.  Overall, reports 50% initial relief, and was better able to tolerate ADLs.  With time, pain has begun to return, though is still mildly improved over baseline.  Wishes to consider additional physical therapy.      Last procedure: LESI #1    Date: 6/3/24    Percentage of relief experienced from the procedure: 50%    Duration of the relief: 25% sustained     The following activities will increase the patient’s pain: walking, standing    The following activities decrease the patient’s pain: limiting activity level    Functional Assessment: Patient reports that they are able to complete all of their ADL's such as eating, bathing, using the toilet, dressing and getting up from a bed or a chair independently.    Current Medications:  Current Outpatient Medications   Medication Sig Dispense Refill    Ciclopirox (PENLAC) 8 % External Solution Apply 1 Application. topically nightly. 1 each 1      Patient requires assistance with: No assistance required    Reviewed Patient History Dated: 6/3/2024 no changes noted    Physical Exam:   /82 (BP Location: Left arm, Patient Position: Sitting, Cuff Size: large)   Pulse 60   SpO2 97%   VAS Pain Score:  3-4/10  General Appearance: Well developed, well nourished, normal build, independent body habitus, no apparent physical disabilities, well groomed    Neurological Exam: WNL-Orientation to time, place and person, normal mood & effect, normal concentration & attention span  Inspection: Nonantalgic,  no acute distress  Radiology/Lab Test Reviewed: MRI L spine:     T12-L1: There is no significant abnormality.      L1-2:  Minimal diffuse disc bulge with mild facet arthropathy. There is no significant spinal canal or neural foraminal stenosis.      L2-3:  Minimal diffuse disc bulge with mild facet arthropathy.  Mild spinal canal stenosis.  No significant neural foraminal stenosis.      L3-4:  Diffuse disc bulge with endplate osteophytes.  Mild facet arthropathy.  Mild to moderate spinal canal stenosis.  Mild bilateral neural foraminal stenosis.      L4-5:  Mild diffuse disc bulge.  Mild-to-moderate facet arthropathy with thickening of ligamentum flavum.  Small facet joint effusions.  Mild to moderate spinal canal stenosis.  Mild bilateral neural foraminal stenosis.      L5-S1:  Mild facet arthropathy. There is no significant spinal canal or neural foraminal stenosis.          Lab Results   Component Value Date    WBC 6.2 04/02/2024    WBC 6.1 01/06/2023   No results found for: \"HEMOGLOBIN\"  Lab Results   Component Value Date    .0 04/02/2024    .0 01/06/2023     Do you have any known blood/bleeding disorders?  No  Does patient currently take blood thinners?   None  Does patient currently take any antibiotics?   No  Patient educated and verbalized understanding.  Medical Decision Making:   Diagnosis:    Encounter Diagnosis   Name Primary?    Spinal stenosis of lumbar region with neurogenic claudication Yes     Impression: 50% initial relief with LESI #1, and was better able to tolerate his day-to-day activities, and was encouraged by his response.  With time, this has waned, though is still approximately 25% improved over baseline, states that the pain is in a smaller distribution (no longer passing the knees).  Continues with low back and bilateral gluteal pain into the right posterior lateral thigh, for which, he does wish to return to physical therapy.  Order placed.  Should he decide to proceed  with repeat injection, contact clinic, we will be happy to place an order for LESI #2.    Plan: Patient to follow up PRN.  Order in for additional physical therapy.  Should he decide to proceed, could place an order for repeat LESI.    No orders of the defined types were placed in this encounter.      Meds & Refills for this Visit:  Requested Prescriptions      No prescriptions requested or ordered in this encounter       Imaging & Consults:  None    The patient indicates understanding of these issues and agrees to the plan.    KELLEN Aguilar

## 2024-06-18 NOTE — PROGRESS NOTES
Last procedure: LUMBAR INTERLAMINAR EPIDURAL INJECTION with local   Date: 6/3/24  Percentage of relief obtained: ~50%  Duration of relief: ~1 week after, now at 10% sustained    Current Pain Score: 3-4/10    Narcotic Contract Exp: n/a

## 2024-06-18 NOTE — PATIENT INSTRUCTIONS
Refill policies:    Allow 2-3 business days for refills; controlled substances may take longer.  Contact your pharmacy at least 5 days prior to running out of medication and have them send an electronic request or submit request through the “request refill” option in your [employer] account.  Refills are not addressed on weekends; covering physicians do not authorize routine medications on weekends.  No narcotics or controlled substances are refilled after noon on Fridays or by on call physicians.  By law, narcotics must be electronically prescribed.  A 30 day supply with no refills is the maximum allowed.  If your prescription is due for a refill, you may be due for a follow up appointment.  To best provide you care, patients receiving routine medications need to be seen at least once a year.  Patients receiving narcotic/controlled substance medications need to be seen at least once every 3 months.  In the event that your preferred pharmacy does not have the requested medication in stock (e.g. Backordered), it is your responsibility to find another pharmacy that has the requested medication available.  We will gladly send a new prescription to that pharmacy at your request.    Scheduling Tests:    If your physician has ordered radiology tests such as MRI or CT scans, please contact Central Scheduling at [phone] right away to schedule the test.  Once scheduled, the [facility] Centralized Referral Team will work with your insurance carrier to obtain pre-certification or prior authorization.  Depending on your insurance carrier, approval may take 3-10 days.  It is highly recommended patients assure they have received an authorization before having a test performed.  If test is done without insurance authorization, patient may be responsible for the entire amount billed.      Precertification and Prior Authorizations:  If your physician has recommended that you have a procedure or additional testing performed the [facility]  Centralized Referral Team will contact your insurance carrier to obtain pre-certification or prior authorization.    You are strongly encouraged to contact your insurance carrier to verify that your procedure/test has been approved and is a COVERED benefit.  Although the [facility] Centralized Referral Team does its due diligence, the insurance carrier gives the disclaimer that \"Although the procedure is authorized, this does not guarantee payment.\"    Ultimately the patient is responsible for payment.   Thank you for your understanding in this matter.  Paperwork Completion:  If you require FMLA or disability paperwork for your recovery, please make sure to either drop it off or have it faxed to our office at [phone]. Be sure the form has your name and date of birth on it.  The form will be faxed to our Forms Department and they will complete it for you.  There is a 25$ fee for all forms that need to be filled out.  Please be aware there is a 10-14 day turnaround time.  You will need to sign a release of information (FRANCISCA) form if your paperwork does not come with one.  You may call the Forms Department with any questions at [phone].  Their fax number is [phone].

## 2024-07-08 ENCOUNTER — TELEPHONE (OUTPATIENT)
Dept: PHYSICAL THERAPY | Facility: HOSPITAL | Age: 54
End: 2024-07-08

## 2024-07-09 ENCOUNTER — OFFICE VISIT (OUTPATIENT)
Dept: PHYSICAL THERAPY | Age: 54
End: 2024-07-09
Attending: PHYSICIAN ASSISTANT
Payer: COMMERCIAL

## 2024-07-09 DIAGNOSIS — M48.062 SPINAL STENOSIS OF LUMBAR REGION WITH NEUROGENIC CLAUDICATION: Primary | ICD-10-CM

## 2024-07-09 PROCEDURE — 97161 PT EVAL LOW COMPLEX 20 MIN: CPT

## 2024-07-09 PROCEDURE — 97110 THERAPEUTIC EXERCISES: CPT

## 2024-07-09 NOTE — PROGRESS NOTES
SPINE EVALUATION:     Diagnosis:   - Spinal stenosis of lumbar region with neurogenic claudication (M48.062)     - LBP with R lumbar radiculopathy    Moderate stenosis, s/p LESI 6/3/24 to moderate initial relief with only mild sustained relief  Referring Provider: Juanito Pringle  Date of Evaluation:   7/9/2024    Precautions:  None Next MD visit:   none scheduled  Date of Surgery:   n/a     MRI RESULTS (5/7/24)  CONCLUSION:       1. Multilevel degenerative changes lumbar spine as above.  Developmental narrowing of the lumbar spinal canal along with epidural lipomatosis contributes to multilevel spinal canal stenosis.      2. Mild spinal canal stenosis at L2-3.  Mild to moderate spinal canal stenosis at L3-4 and L4-5.      3. Mild bilateral neural foraminal stenosis at L3-4 and L4-5.      4. Facet arthropathy throughout the lumbar spine, most pronounced at L4-5.     PATIENT SUMMARY   Freddy Ontiveros is a 54 year old male who presents to therapy today with complaints of central LBP that will radiate down his R leg intermittently. YOCASTA - back squats in gym about a year ago.  He states chiropractor helped a little bit back in October 2023.  He states he got an injection about a month ago which helped temporarily.  He states vacation to [place] resulted in more LBP from plane ride.  He states he went on a hike and it seemed to help. He states he has intermittent pain in R leg that goes down into his calf and sometimes it will linger.     Pt describes pain level current 2/10, at best 1/10, at worst 7/10.   Current functional limitations include pain and or difficulty w/ stair navigation, prolonged sitting (although pt states he tries to sit with good posture), w/ yardwork, prolonged standing >1 hr, golfing, is cautious w/ forward bending.     Freddy describes prior level of function - Hx of occasional low back tightness. Pt goals include decrease pain, return to painfree ADLs, return to fitness routine.    Past  medical history was reviewed with Freddy. Significant findings include:  Patient Active Problem List   Diagnosis    Personal history of COVID-19    Lipoma of skin of abdomen    10 year risk of MI or stroke < 7.5%     Past Medical History:    Arthritis    Wears glasses     Past Surgical History:   Procedure Laterality Date    Appendectomy      Tonsillectomy       Current Outpatient Medications on File Prior to Visit   Medication Sig Dispense Refill    Ciclopirox (PENLAC) 8 % External Solution Apply 1 Application. topically nightly. 1 each 1     No current facility-administered medications on file prior to visit.     Pt denies diplopia, dysarthria, dysphasia, dizziness, drop attacks, bowel/bladder changes, saddle anesthesia, and ARY LE N/T.     ASSESSMENT  Freddy presents to physical therapy evaluation with primary c/o low back pain that radiates into his R leg down to his calf intermittently. The results of the objective tests and measures show impaired lumbar ROM, point tenderness, impaired core strength, impaired flexibility, decreased function.  Functional deficits include but are not limited to pain and or difficulty w/ stair navigation, prolonged sitting (although pt states he tries to sit with good posture), w/ yardwork, prolonged standing >1 hr, golfing, is cautious w/ forward bending.  Signs and symptoms are consistent with diagnosis of LBP w/ R lumbar radiculopathy. Pt and PT discussed evaluation findings, pathology, POC and HEP.  Pt voiced understanding and performs HEP correctly without reported pain. Skilled Physical Therapy is medically necessary to address the above impairments and reach functional goals.     OBJECTIVE:   Observation/Posture: Unremarkable  Neuro Screen: Dermatomes WNL    LUMBAR AROM: (* denotes performed with pain)  Flexion: 80%* w/ B HS tightness and mild \"twinge\" of central LBP upon return to standing  Extension: 75% w/ low back pressure  Sidebending: R 75%* w/ mild pain; L  85%    Accessory motion: Mild stiffness w/ lumbar Pas  Palpation: TTP R lower lumbar paraspinals    Strength: (* denotes performed with pain)  LE   Hip flexion (L2): R 5/5; L 5/5  Hip abduction: R 4/5; L 4/5  Knee extension (L3): R 5/5; L 5/5   Knee Flexion: R 5/5; L 5/5   DF (L4): R 5/5; L 5/5  PF (S1): R 5/5; L 5/5  Great Toe Ext (L5): R 5/5, L 5/5    TA: 3-/5     Flexibility:   LE   Hip Flexor: R NT, L NT  Hamstrings: R tight; L tight  Piriformis: R tight; L tight  Quads: R NT; L NT  Gastroc-soleus: R NT; L NT     Special tests:     RFIS: worse   GORGE: no worse    Slump: (-)  SLR: L=52, R=62     Toe walking: (-)  Heel walking: (-)    RFIsitting: no change  PPU - elbows: better     Gait: pt ambulates on level ground with normal mechanics.  Balance: SLS R NT, L NT    Oswestry Disability Index Score  Score: 30 % (7/8/2024  4:40 PM)    Today’s Treatment and Response:   Pt education was provided on exam findings, treatment diagnosis, treatment plan, expectations, and prognosis. Pt was also provided recommendations for possible soreness after evaluation, modalities as needed [ice/heat], and pain science education     Patient was instructed in and issued a HEP for:   4x/day - PPU from elbows 2x10  2x/day - R/L modified piriformis stretch 3x15\", B seated ankle pumps 2x10    Charges: PT Eval Low Complexity, 1 TE      Total Timed Treatment: 10 min     Total Treatment Time: 40 min     Based on clinical rationale and outcome measures, this evaluation involved Low Complexity   PLAN OF CARE:    Goals: (to be met in 12 visits)   Pt will improve transversus abdominis recruitment to perform proper isometric contraction without requiring verbal or tactile cuing to promote advancement of therex   Pt will demonstrate good understanding of proper posture and body mechanics to decrease pain and improve spinal safety   Pt will improve lumbar spine AROM flexion to 100% to allow increase ease with bending forward to don shoes   Pt will  report improved symptom centralization and absence of radicular symptoms for 3 consecutive days to improve function with ADL   Pt will have decreased paraspinal mm tension to tolerate standing >90 minutes for work and home activities   Pt will demonstrate improved core strength to be able to perform lift and carry with <1/10 pain   Pt will be independent and compliant with comprehensive HEP to maintain progress achieved in PT   Pt will demo 4+/5 TA strength    Frequency / Duration: Patient will be seen for 1-2 x/week or a total of 12 visits over a 90 day period. Treatment will include: Manual Therapy, Neuromuscular Re-education, Self-Care Home Management, Therapeutic Activities, Therapeutic Exercise, Home Exercise Program instruction, Modalities to include: Electrical stimulation (unattended) and ice, heat, and dry needling    Education or treatment limitation: None  Rehab Potential:excellent    Patient/Family/Caregiver was advised of these findings, precautions, and treatment options and has agreed to actively participate in planning and for this course of care.    Thank you for your referral. Please co-sign or sign and return this letter via fax as soon as possible to [phone]. If you have any questions, please contact me at Dept: [phone]    Sincerely,  Electronically signed by therapist: Dave Gusman, PT    Physician's certification required: Yes  I certify the need for these services furnished under this plan of treatment and while under my care.    X___________________________________________________ Date____________________    Certification From: 7/9/2024  To:10/7/2024

## 2024-07-12 ENCOUNTER — OFFICE VISIT (OUTPATIENT)
Dept: PHYSICAL THERAPY | Age: 54
End: 2024-07-12
Attending: PHYSICIAN ASSISTANT
Payer: COMMERCIAL

## 2024-07-12 PROCEDURE — 97110 THERAPEUTIC EXERCISES: CPT

## 2024-07-12 PROCEDURE — 97140 MANUAL THERAPY 1/> REGIONS: CPT

## 2024-07-12 NOTE — PROGRESS NOTES
Diagnosis:   - Spinal stenosis of lumbar region with neurogenic claudication (M48.062)     - LBP with R lumbar radiculopathy    Moderate stenosis, s/p LESI 6/3/24 to moderate initial relief with only mild sustained relief       Referring Provider: Juanito Pringle  Date of Evaluation:    7/9/2024    Precautions:  None Next MD visit:   none scheduled  Date of Surgery: n/a   Insurance Primary/Secondary: [employer] / N/A     # Auth Visits: no auth           MRI RESULTS (5/7/24)  CONCLUSION:       1. Multilevel degenerative changes lumbar spine as above.  Developmental narrowing of the lumbar spinal canal along with epidural lipomatosis contributes to multilevel spinal canal stenosis.      2. Mild spinal canal stenosis at L2-3.  Mild to moderate spinal canal stenosis at L3-4 and L4-5.      3. Mild bilateral neural foraminal stenosis at L3-4 and L4-5.      4. Facet arthropathy throughout the lumbar spine, most pronounced at L4-5.      Subjective: pt arrives to PT today and states his s/s are pretty good.  States he feels a little stiff from mowing lawn.  States leg s/s are not present thus far today however he did notice some leg pain after prolonged sitting yesterday while working.    Pain: 2/10      Objective:   Lumbar paraspinals TTP        Initial eval objectives:  Observation/Posture: Unremarkable  Neuro Screen: Dermatomes WNL    LUMBAR AROM: (* denotes performed with pain)  Flexion: 80%* w/ B HS tightness and mild \"twinge\" of central LBP upon return to standing  Extension: 75% w/ low back pressure  Sidebending: R 75%* w/ mild pain; L 85%    Accessory motion: Mild stiffness w/ lumbar Pas  Palpation: TTP R lower lumbar paraspinals    Strength: (* denotes performed with pain)  LE   Hip flexion (L2): R 5/5; L 5/5  Hip abduction: R 4/5; L 4/5  Knee extension (L3): R 5/5; L 5/5   Knee Flexion: R 5/5; L 5/5   DF (L4): R 5/5; L 5/5  PF (S1): R 5/5; L 5/5  Great Toe Ext (L5): R 5/5, L 5/5    TA: 3-/5     Flexibility:   LE    Hip Flexor: R NT, L NT  Hamstrings: R tight; L tight  Piriformis: R tight; L tight  Quads: R NT; L NT  Gastroc-soleus: R NT; L NT     Special tests:     RFIS: worse   GORGE: no worse    Slump: (-)  SLR: L=52, R=62     Toe walking: (-)  Heel walking: (-)    RFIsitting: no change  PPU - elbows: better     Gait: pt ambulates on level ground with normal mechanics.  Balance: SLS R NT, L NT    Oswestry Disability Index Score  Score: 30 % (7/8/2024  4:40 PM)      Assessment:   Pt tolerated PT tx well today.  Added manual work today to address tightness, tone, and tenderness in lumbar paraspinals.  Also performed joint mobilizations for improved lumbar segmental mobility.  Initiated core strengthening today and added PPTs to HEP.         Goals:   (to be met in 12 visits)   Pt will improve transversus abdominis recruitment to perform proper isometric contraction without requiring verbal or tactile cuing to promote advancement of therex   Pt will demonstrate good understanding of proper posture and body mechanics to decrease pain and improve spinal safety   Pt will improve lumbar spine AROM flexion to 100% to allow increase ease with bending forward to don shoes   Pt will report improved symptom centralization and absence of radicular symptoms for 3 consecutive days to improve function with ADL   Pt will have decreased paraspinal mm tension to tolerate standing >90 minutes for work and home activities   Pt will demonstrate improved core strength to be able to perform lift and carry with <1/10 pain   Pt will be independent and compliant with comprehensive HEP to maintain progress achieved in PT   Pt will demo 4+/5 TA strength    Plan: Next visit consider - PPU from hands, PPT w/ march  Date: 7/12/2024  TX#: 2/12 Date:                 TX#: 3/ Date:                 TX#: 4/ Date:                 TX#: 5/ Date:   Tx#: 6/   Upright bike - L5 - 5'   PPU - elbows x10  PPU - elbows w/ exhale x10  R/L mod piri stretch 3x15\"  LTRs  x10  PPTs 2x10, 3\"  PPTs w/ pilates ring pressdown in hooklying 2x10, 3\"  RTB B ext w/ TA and feet close 5\" hold x15                     Manual: 10'  - STM: lumbar PVM  - Joint mobs: lumbar Pas G2-3       HEP:   4x/day - PPU from elbows + exhale 2x10  2x/day - R/L modified piriformis stretch 3x15\", B seated ankle pumps 2x10, PPTs 2x10    Charges: 1 manual, 2 TE       Total Timed Treatment: 42 min  Total Treatment Time: 42 min

## 2024-07-16 ENCOUNTER — OFFICE VISIT (OUTPATIENT)
Dept: PHYSICAL THERAPY | Age: 54
End: 2024-07-16
Attending: PHYSICIAN ASSISTANT
Payer: COMMERCIAL

## 2024-07-16 PROCEDURE — 97110 THERAPEUTIC EXERCISES: CPT

## 2024-07-16 PROCEDURE — 97140 MANUAL THERAPY 1/> REGIONS: CPT

## 2024-07-16 NOTE — PROGRESS NOTES
Diagnosis:   - Spinal stenosis of lumbar region with neurogenic claudication (M48.062)     - LBP with R lumbar radiculopathy    Moderate stenosis, s/p LESI 6/3/24 to moderate initial relief with only mild sustained relief       Referring Provider: Juanito Pringle  Date of Evaluation:    7/9/2024    Precautions:  None Next MD visit:   none scheduled  Date of Surgery: n/a   Insurance Primary/Secondary: [employer] / N/A     # Auth Visits: no auth           MRI RESULTS (5/7/24)  CONCLUSION:       1. Multilevel degenerative changes lumbar spine as above.  Developmental narrowing of the lumbar spinal canal along with epidural lipomatosis contributes to multilevel spinal canal stenosis.      2. Mild spinal canal stenosis at L2-3.  Mild to moderate spinal canal stenosis at L3-4 and L4-5.      3. Mild bilateral neural foraminal stenosis at L3-4 and L4-5.      4. Facet arthropathy throughout the lumbar spine, most pronounced at L4-5.      Subjective: pt arrives to PT today and states he has been sitting a lot for work.  He states he had 4 or 5 episodes over the weekend in his LE.      Pain:   3/10      Objective:   Upper lumbar paraspinals demo increased tone and tenderness.          Initial eval objectives:  Observation/Posture: Unremarkable  Neuro Screen: Dermatomes WNL    LUMBAR AROM: (* denotes performed with pain)  Flexion: 80%* w/ B HS tightness and mild \"twinge\" of central LBP upon return to standing  Extension: 75% w/ low back pressure  Sidebending: R 75%* w/ mild pain; L 85%    Accessory motion: Mild stiffness w/ lumbar Pas  Palpation: TTP R lower lumbar paraspinals    Strength: (* denotes performed with pain)  LE   Hip flexion (L2): R 5/5; L 5/5  Hip abduction: R 4/5; L 4/5  Knee extension (L3): R 5/5; L 5/5   Knee Flexion: R 5/5; L 5/5   DF (L4): R 5/5; L 5/5  PF (S1): R 5/5; L 5/5  Great Toe Ext (L5): R 5/5, L 5/5    TA: 3-/5     Flexibility:   LE   Hip Flexor: R NT, L NT  Hamstrings: R tight; L tight  Piriformis: R  tight; L tight  Quads: R NT; L NT  Gastroc-soleus: R NT; L NT     Special tests:     RFIS: worse   GORGE: no worse    Slump: (-)  SLR: L=52, R=62     Toe walking: (-)  Heel walking: (-)    RFIsitting: no change  PPU - elbows: better     Gait: pt ambulates on level ground with normal mechanics.  Balance: SLS R NT, L NT    Oswestry Disability Index Score  Score: 30 % (7/8/2024  4:40 PM)      Assessment:   Pt tolerated PT tx well today.  Progressed pt to press up from hands per Shukri DENNIST progression.  Discussed Shukri pillow while sitting.  Continued with core exercises as tolerated w/o pain.  Added press ups from hands to HEP.  Progressed pt to PPTs w/ march.         Goals:   (to be met in 12 visits)   Pt will improve transversus abdominis recruitment to perform proper isometric contraction without requiring verbal or tactile cuing to promote advancement of therex   Pt will demonstrate good understanding of proper posture and body mechanics to decrease pain and improve spinal safety   Pt will improve lumbar spine AROM flexion to 100% to allow increase ease with bending forward to don shoes   Pt will report improved symptom centralization and absence of radicular symptoms for 3 consecutive days to improve function with ADL   Pt will have decreased paraspinal mm tension to tolerate standing >90 minutes for work and home activities   Pt will demonstrate improved core strength to be able to perform lift and carry with <1/10 pain   Pt will be independent and compliant with comprehensive HEP to maintain progress achieved in PT   Pt will demo 4+/5 TA strength    Plan: Next visit consider - PPU in R RK, seated sciatic nn glides, B knee stops  Date: 7/12/2024  TX#: 2/12 Date:   7/16/24              TX#: 3/12 Date:                 TX#: 4/ Date:                 TX#: 5/ Date:   Tx#: 6/   Upright bike - L5 - 5'   PPU - elbows x10  PPU - elbows w/ exhale x10  R/L mod piri stretch 3x15\"  LTRs x10  PPTs 2x10, 3\"  PPTs w/ pilates  ring pressdown in hooklying 2x10, 3\"  RTB B ext w/ TA and feet close 5\" hold x15 Upright bike - L5 - S4 - 5'   Shukri pillow demo  PPU - elbows w/ exhale x10  PPU - hands w/ exhale x10  R/L mod piri stretch 3x15\"  PPTs w/ dark pilates ring pressdown in hooklying 2x10, 3\"  PPTs w/ dark pilates ring - R and L bias - x10 each 3\"  PPT w/ march 2x10  90/90 SS heel tap marches x5                    Manual: 10'  - STM: lumbar PVM  - Joint mobs: lumbar Pas G2-3 Manual: 15'  - STM: lumbar PVM  - Joint mobs: lumbar Pas G2-3      HEP:   2-3x/day - PPU from elbows + exhale 1x10, PPU from hands + exhale 1x10  2x/day - R/L modified piriformis stretch 3x15\", B seated ankle pumps 2x10, PPTs 2x10, 90/90 SS heel tap marches    Charges: 1 manual, 2 TE       Total Timed Treatment: 40 min  Total Treatment Time: 40 min

## 2024-07-19 ENCOUNTER — OFFICE VISIT (OUTPATIENT)
Dept: PHYSICAL THERAPY | Age: 54
End: 2024-07-19
Attending: PHYSICIAN ASSISTANT
Payer: COMMERCIAL

## 2024-07-19 PROCEDURE — 97110 THERAPEUTIC EXERCISES: CPT

## 2024-07-19 PROCEDURE — 97140 MANUAL THERAPY 1/> REGIONS: CPT

## 2024-07-19 NOTE — PROGRESS NOTES
Diagnosis:   - Spinal stenosis of lumbar region with neurogenic claudication (M48.062)     - LBP with R lumbar radiculopathy    Moderate stenosis, s/p LESI 6/3/24 to moderate initial relief with only mild sustained relief       Referring Provider: Juanito Pringle  Date of Evaluation:    7/9/2024    Precautions:  None Next MD visit:   none scheduled  Date of Surgery: n/a   Insurance Primary/Secondary: [employer]IG[employer] / N/A     # Auth Visits: no auth           MRI RESULTS (5/7/24)  CONCLUSION:       1. Multilevel degenerative changes lumbar spine as above.  Developmental narrowing of the lumbar spinal canal along with epidural lipomatosis contributes to multilevel spinal canal stenosis.      2. Mild spinal canal stenosis at L2-3.  Mild to moderate spinal canal stenosis at L3-4 and L4-5.      3. Mild bilateral neural foraminal stenosis at L3-4 and L4-5.      4. Facet arthropathy throughout the lumbar spine, most pronounced at L4-5.      Subjective: pt arrives to PT today and states he is feeling pretty good today.  He denies radicular LE s/s for the last day.  States he has been sitting w/ pillow behind back.     Pain:   1-2/10      Objective:   Mm fasciculations w/ TA exercises        Initial eval objectives:  Observation/Posture: Unremarkable  Neuro Screen: Dermatomes WNL    LUMBAR AROM: (* denotes performed with pain)  Flexion: 80%* w/ B HS tightness and mild \"twinge\" of central LBP upon return to standing  Extension: 75% w/ low back pressure  Sidebending: R 75%* w/ mild pain; L 85%    Accessory motion: Mild stiffness w/ lumbar Pas  Palpation: TTP R lower lumbar paraspinals    Strength: (* denotes performed with pain)  LE   Hip flexion (L2): R 5/5; L 5/5  Hip abduction: R 4/5; L 4/5  Knee extension (L3): R 5/5; L 5/5   Knee Flexion: R 5/5; L 5/5   DF (L4): R 5/5; L 5/5  PF (S1): R 5/5; L 5/5  Great Toe Ext (L5): R 5/5, L 5/5    TA: 3-/5     Flexibility:   LE   Hip Flexor: R NT, L NT  Hamstrings: R tight; L  tight  Piriformis: R tight; L tight  Quads: R NT; L NT  Gastroc-soleus: R NT; L NT     Special tests:     RFIS: worse   GORGE: no worse    Slump: (-)  SLR: L=52, R=62     Toe walking: (-)  Heel walking: (-)    RFIsitting: no change  PPU - elbows: better     Gait: pt ambulates on level ground with normal mechanics.  Balance: SLS R NT, L NT    Oswestry Disability Index Score  Score: 30 % (7/8/2024  4:40 PM)      Assessment:   Pt tolerated PT tx well today.  Progressed pt to prone press [facility] to further address radicular LE s/s - added to HEP.  Pt continues to demo core Mm fasciculations w/ TA exercises indicating continued strength, control, endurance impairment.  Progressed pt to bilateral knee stops for greater TA activation / strength - also added to HEP.          Goals:   (to be met in 12 visits)   Pt will improve transversus abdominis recruitment to perform proper isometric contraction without requiring verbal or tactile cuing to promote advancement of therex   Pt will demonstrate good understanding of proper posture and body mechanics to decrease pain and improve spinal safety   Pt will improve lumbar spine AROM flexion to 100% to allow increase ease with bending forward to don shoes   Pt will report improved symptom centralization and absence of radicular symptoms for 3 consecutive days to improve function with ADL   Pt will have decreased paraspinal mm tension to tolerate standing >90 minutes for work and home activities   Pt will demonstrate improved core strength to be able to perform lift and carry with <1/10 pain   Pt will be independent and compliant with comprehensive HEP to maintain progress achieved in PT   Pt will demo 4+/5 TA strength    Plan: Next visit consider - seated sciatic nn glides  Date: 7/12/2024  TX#: 2/12 Date:   7/16/24              TX#: 3/12 Date: 7/19/24                 TX#: 4/12 Date:                 TX#: 5/ Date:   Tx#: 6/   Upright bike - L5 - 5'   PPU - elbows x10  PPU -  elbows w/ exhale x10  R/L mod piri stretch 3x15\"  LTRs x10  PPTs 2x10, 3\"  PPTs w/ pilates ring pressdown in hooklying 2x10, 3\"  RTB B ext w/ TA and feet close 5\" hold x15 Upright bike - L5 - S4 - 5'   Shukri pillow demo  PPU - elbows w/ exhale x10  PPU - hands w/ exhale x10  R/L mod piri stretch 3x15\"  PPTs w/ dark pilates ring pressdown in hooklying 2x10, 3\"  PPTs w/ dark pilates ring - R and L bias - x10 each 3\"  PPT w/ march 2x10  90/90 SS heel tap marches x5 Upright bike - L8 - S4 - 5'   PPU - hands w/ exhale x10  PPU - hands in R [place] x10  PPTs w/ dark pilates ring pressdown in hooklying 1x10, 3\"  B knee stops 5\" x10  R/L mod piri stretch 3x15\"  90/90 SS heel tap marches 2x5  RTB B ext w/ feet close and TA brace x20  Bear crawl isometric hold 5\" x5  LTRs x10  Cat/camel x10  PPU - hands w/ exhale x10                   Manual: 10'  - STM: lumbar PVM  - Joint mobs: lumbar Pas G2-3 Manual: 15'  - STM: lumbar PVM  - Joint mobs: lumbar Pas G2-3 Manual: 10'  - STM: lumbar PVM  - Joint mobs: lumbar Pas G2-4     HEP:   2-3x/day - PPU from hands + exhale 1x10, PPU - hands in R [place] 1x10  2x/day - R/L modified piriformis stretch 3x15\", B seated ankle pumps 2x10, PPTs 2x10, 90/90 SS heel tap marches    Charges: 1 manual, 1 TE       Total Timed Treatment: 35 min  Total Treatment Time: 35 min

## 2024-07-23 ENCOUNTER — OFFICE VISIT (OUTPATIENT)
Dept: PHYSICAL THERAPY | Age: 54
End: 2024-07-23
Attending: PHYSICIAN ASSISTANT
Payer: COMMERCIAL

## 2024-07-23 PROCEDURE — 97110 THERAPEUTIC EXERCISES: CPT

## 2024-07-23 PROCEDURE — 97140 MANUAL THERAPY 1/> REGIONS: CPT

## 2024-07-23 NOTE — PROGRESS NOTES
Diagnosis:   - Spinal stenosis of lumbar region with neurogenic claudication (M48.062)     - LBP with R lumbar radiculopathy    Moderate stenosis, s/p LESI 6/3/24 to moderate initial relief with only mild sustained relief       Referring Provider: Juanito Pringle  Date of Evaluation:    7/9/2024    Precautions:  None Next MD visit:   none scheduled  Date of Surgery: n/a   Insurance Primary/Secondary: [employer]IG[employer] / N/A     # Auth Visits: no auth           MRI RESULTS (5/7/24)  CONCLUSION:       1. Multilevel degenerative changes lumbar spine as above.  Developmental narrowing of the lumbar spinal canal along with epidural lipomatosis contributes to multilevel spinal canal stenosis.      2. Mild spinal canal stenosis at L2-3.  Mild to moderate spinal canal stenosis at L3-4 and L4-5.      3. Mild bilateral neural foraminal stenosis at L3-4 and L4-5.      4. Facet arthropathy throughout the lumbar spine, most pronounced at L4-5.      Subjective: pt arrives to PT today and states sitting is still an issue.  He states he felt a few \"twinges\" in his bilateral glute/high hamstring when transferring out of the car today.     Pain:   1-2/10      Objective:   (+) slump test on R for neural tension        Initial eval objectives:  Observation/Posture: Unremarkable  Neuro Screen: Dermatomes WNL    LUMBAR AROM: (* denotes performed with pain)  Flexion: 80%* w/ B HS tightness and mild \"twinge\" of central LBP upon return to standing  Extension: 75% w/ low back pressure  Sidebending: R 75%* w/ mild pain; L 85%    Accessory motion: Mild stiffness w/ lumbar Pas  Palpation: TTP R lower lumbar paraspinals    Strength: (* denotes performed with pain)  LE   Hip flexion (L2): R 5/5; L 5/5  Hip abduction: R 4/5; L 4/5  Knee extension (L3): R 5/5; L 5/5   Knee Flexion: R 5/5; L 5/5   DF (L4): R 5/5; L 5/5  PF (S1): R 5/5; L 5/5  Great Toe Ext (L5): R 5/5, L 5/5    TA: 3-/5     Flexibility:   LE   Hip Flexor: R NT, L NT  Hamstrings: R tight; L  tight  Piriformis: R tight; L tight  Quads: R NT; L NT  Gastroc-soleus: R NT; L NT     Special tests:     RFIS: worse   GORGE: no worse    Slump: (-)  SLR: L=52, R=62     Toe walking: (-)  Heel walking: (-)    RFIsitting: no change  PPU - elbows: better     Gait: pt ambulates on level ground with normal mechanics.  Balance: SLS R NT, L NT    Oswestry Disability Index Score  Score: 30 % (7/8/2024  4:40 PM)      Assessment:   Pt tolerated PT tx well today.  Progressed pt to repeated lumbar extension in standing per Shukri MDT to further address radicular pain.  Pt continues to demo greater neural tension of RLE compared to LLE.  Added seated sciatic nn glides and repeated lumbar extension in standing to HEP.          Goals:   (to be met in 12 visits)   Pt will improve transversus abdominis recruitment to perform proper isometric contraction without requiring verbal or tactile cuing to promote advancement of therex   Pt will demonstrate good understanding of proper posture and body mechanics to decrease pain and improve spinal safety   Pt will improve lumbar spine AROM flexion to 100% to allow increase ease with bending forward to don shoes   Pt will report improved symptom centralization and absence of radicular symptoms for 3 consecutive days to improve function with ADL   Pt will have decreased paraspinal mm tension to tolerate standing >90 minutes for work and home activities   Pt will demonstrate improved core strength to be able to perform lift and carry with <1/10 pain   Pt will be independent and compliant with comprehensive HEP to maintain progress achieved in PT   Pt will demo 4+/5 TA strength    Plan: Next visit consider - RSGIS   Date: 7/12/2024  TX#: 2/12 Date:   7/16/24              TX#: 3/12 Date: 7/19/24                 TX#: 4/12 Date:  7/23/24               TX#: 5/12 Date:   Tx#: 6/   Upright bike - L5 - 5'   PPU - elbows x10  PPU - elbows w/ exhale x10  R/L mod piri stretch 3x15\"  LTRs x10  PPTs  2x10, 3\"  PPTs w/ pilates ring pressdown in hooklying 2x10, 3\"  RTB B ext w/ TA and feet close 5\" hold x15 Upright bike - L5 - S4 - 5'   Shukri pillow demo  PPU - elbows w/ exhale x10  PPU - hands w/ exhale x10  R/L mod piri stretch 3x15\"  PPTs w/ dark pilates ring pressdown in hooklying 2x10, 3\"  PPTs w/ dark pilates ring - R and L bias - x10 each 3\"  PPT w/ march 2x10  90/90 SS heel tap marches x5 Upright bike - L8 - S4 - 5'   PPU - hands w/ exhale x10  PPU - hands in R [place] x10  PPTs w/ dark pilates ring pressdown in hooklying 1x10, 3\"  B knee stops 5\" x10  R/L mod piri stretch 3x15\"  90/90 SS heel tap marches 2x5  RTB B ext w/ feet close and TA brace x20  Bear crawl isometric hold 5\" x5  LTRs x10  Cat/camel x10  PPU - hands w/ exhale x10 Upright bike - L8 - S4 - 5'   PPU - hands w/ exhale x10  PPU - hands in R [place] x10  GORGE - x10  B knee stops 5\" x10  Deadbugs - contralat UE/LE 2x5  Seated sciatic R/L LAQ nn glides x20                  Manual: 10'  - STM: lumbar PVM  - Joint mobs: lumbar Pas G2-3 Manual: 15'  - STM: lumbar PVM  - Joint mobs: lumbar Pas G2-3 Manual: 10'  - STM: lumbar PVM  - Joint mobs: lumbar Pas G2-4 Manual: 15'  - STM: lumbar PVM  - Joint mobs: lumbar Pas G2-4  - [employer] IASTM - B upper glutes    HEP:   1-2x/day - PPU from hands + exhale 1x10, PPU - hands in R [place] 1x10, R/L modified piriformis stretch 3x15\", B seated ankle pumps 2x10, PPTs 2x10, 90/90 SS heel tap marches, GORGE 1x10, R/L seated LAQ nn glides 1x10    Charges: 1 manual, 2 TE       Total Timed Treatment: 38 min  Total Treatment Time: 38 min

## 2024-07-26 ENCOUNTER — TELEPHONE (OUTPATIENT)
Dept: PHYSICAL THERAPY | Facility: HOSPITAL | Age: 54
End: 2024-07-26

## 2024-07-26 ENCOUNTER — APPOINTMENT (OUTPATIENT)
Dept: PHYSICAL THERAPY | Age: 54
End: 2024-07-26
Attending: PHYSICIAN ASSISTANT
Payer: COMMERCIAL

## 2024-07-30 ENCOUNTER — OFFICE VISIT (OUTPATIENT)
Dept: PHYSICAL THERAPY | Age: 54
End: 2024-07-30
Attending: PHYSICIAN ASSISTANT
Payer: COMMERCIAL

## 2024-07-30 PROCEDURE — 97140 MANUAL THERAPY 1/> REGIONS: CPT

## 2024-07-30 PROCEDURE — 97110 THERAPEUTIC EXERCISES: CPT

## 2024-07-30 NOTE — PROGRESS NOTES
Diagnosis:   - Spinal stenosis of lumbar region with neurogenic claudication (M48.062)     - LBP with R lumbar radiculopathy    Moderate stenosis, s/p LESI 6/3/24 to moderate initial relief with only mild sustained relief       Referring Provider: Juainto Pringle  Date of Evaluation:    7/9/2024    Precautions:  None Next MD visit:   none scheduled  Date of Surgery: n/a   Insurance Primary/Secondary: [employer] / N/A     # Auth Visits: no auth           MRI RESULTS (5/7/24)  CONCLUSION:       1. Multilevel degenerative changes lumbar spine as above.  Developmental narrowing of the lumbar spinal canal along with epidural lipomatosis contributes to multilevel spinal canal stenosis.      2. Mild spinal canal stenosis at L2-3.  Mild to moderate spinal canal stenosis at L3-4 and L4-5.      3. Mild bilateral neural foraminal stenosis at L3-4 and L4-5.      4. Facet arthropathy throughout the lumbar spine, most pronounced at L4-5.      Subjective: pt arrives to PT today and states he feels tighter and has been having shooting pain down his legs.      Pain:   3-4/10      Objective:   (+) slump test on L for neural tension        Initial eval objectives:  Observation/Posture: Unremarkable  Neuro Screen: Dermatomes WNL    LUMBAR AROM: (* denotes performed with pain)  Flexion: 80%* w/ B HS tightness and mild \"twinge\" of central LBP upon return to standing  Extension: 75% w/ low back pressure  Sidebending: R 75%* w/ mild pain; L 85%    Accessory motion: Mild stiffness w/ lumbar Pas  Palpation: TTP R lower lumbar paraspinals    Strength: (* denotes performed with pain)  LE   Hip flexion (L2): R 5/5; L 5/5  Hip abduction: R 4/5; L 4/5  Knee extension (L3): R 5/5; L 5/5   Knee Flexion: R 5/5; L 5/5   DF (L4): R 5/5; L 5/5  PF (S1): R 5/5; L 5/5  Great Toe Ext (L5): R 5/5, L 5/5    TA: 3-/5     Flexibility:   LE   Hip Flexor: R NT, L NT  Hamstrings: R tight; L tight  Piriformis: R tight; L tight  Quads: R NT; L NT  Gastroc-soleus: R  NT; L NT     Special tests:     RFIS: worse   GORGE: no worse    Slump: (-)  SLR: L=52, R=62     Toe walking: (-)  Heel walking: (-)    RFIsitting: no change  PPU - elbows: better     Gait: pt ambulates on level ground with normal mechanics.  Balance: SLS R NT, L NT    Oswestry Disability Index Score  Score: 30 % (7/8/2024  4:40 PM)      Assessment:   Pt tolerated PT tx well today.  Pt verbalized gradually improving s/s throughout today's session - modified HEP: Prone lying on wedge 5', R/L modified piriformis stretch, R/L reclined seated LAQ nn glides 2x10.  Pt may have flared s/s up doing seated nn glide as he states he was pulling ankle / toes up which is actually more aggressive and a neural tensioning stretch, however pt is likely not ready for neural tensioning stretching at this time.         Goals:   (to be met in 12 visits)   Pt will improve transversus abdominis recruitment to perform proper isometric contraction without requiring verbal or tactile cuing to promote advancement of therex   Pt will demonstrate good understanding of proper posture and body mechanics to decrease pain and improve spinal safety   Pt will improve lumbar spine AROM flexion to 100% to allow increase ease with bending forward to don shoes   Pt will report improved symptom centralization and absence of radicular symptoms for 3 consecutive days to improve function with ADL   Pt will have decreased paraspinal mm tension to tolerate standing >90 minutes for work and home activities   Pt will demonstrate improved core strength to be able to perform lift and carry with <1/10 pain   Pt will be independent and compliant with comprehensive HEP to maintain progress achieved in PT   Pt will demo 4+/5 TA strength    Plan: Next visit consider - SGIS   Date: 7/12/2024  TX#: 2/12 Date:   7/16/24              TX#: 3/12 Date: 7/19/24                 TX#: 4/12 Date:  7/23/24               TX#: 5/12 Date: 7/30/24  Tx#: 6/12   Upright bike - L5 - 5'    PPU - elbows x10  PPU - elbows w/ exhale x10  R/L mod piri stretch 3x15\"  LTRs x10  PPTs 2x10, 3\"  PPTs w/ pilates ring pressdown in hooklying 2x10, 3\"  RTB B ext w/ TA and feet close 5\" hold x15 Upright bike - L5 - S4 - 5'   Shukri pillow demo  PPU - elbows w/ exhale x10  PPU - hands w/ exhale x10  R/L mod piri stretch 3x15\"  PPTs w/ dark pilates ring pressdown in hooklying 2x10, 3\"  PPTs w/ dark pilates ring - R and L bias - x10 each 3\"  PPT w/ march 2x10  90/90 SS heel tap marches x5 Upright bike - L8 - S4 - 5'   PPU - hands w/ exhale x10  PPU - hands in R [place] x10  PPTs w/ dark pilates ring pressdown in hooklying 1x10, 3\"  B knee stops 5\" x10  R/L mod piri stretch 3x15\"  90/90 SS heel tap marches 2x5  RTB B ext w/ feet close and TA brace x20  Bear crawl isometric hold 5\" x5  LTRs x10  Cat/camel x10  PPU - hands w/ exhale x10 Upright bike - L8 - S4 - 5'   PPU - hands w/ exhale x10  PPU - hands in R [place] x10  GORGE - x10  B knee stops 5\" x10  Deadbugs - contralat UE/LE 2x5  Seated sciatic R/L LAQ nn glides x20 Upright bike - L8 - S4 - 5.5'   PPU - hands w/ exhale x5  GORGE - x10  Prone lying on wedge - 3' x2  R/L mod piri stretch 3x15\"  Reclined seated sciatic R/L LAQ nn glides x20   B knee stops 5\" x10                   Manual: 10'  - STM: lumbar PVM  - Joint mobs: lumbar Pas G2-3 Manual: 15'  - STM: lumbar PVM  - Joint mobs: lumbar Pas G2-3 Manual: 10'  - STM: lumbar PVM  - Joint mobs: lumbar Pas G2-4 Manual: 15'  - STM: lumbar PVM  - Joint mobs: lumbar Pas G2-4  - Hypervolt IASTM - B upper glutes Manual: 10'  - STM: lumbar PVM  - Joint mobs: lumbar Pas G2-4   HEP:   1-2x/day - PPU from hands + exhale 1x10, PPU - hands in R [place] 1x10, R/L modified piriformis stretch 3x15\", B seated ankle pumps 2x10, PPTs 2x10, 90/90 SS heel tap marchbabak GORGE 1x10, R/L seated LAQ nn glides 1x10  (hold for now)    BID: Prone lying on wedge 5', R/L mod piri stretch, R/L reclined seated LAQ nn glides 2x10    Charges: 1  manual, 2 TE       Total Timed Treatment: 40 min  Total Treatment Time: 40 min

## 2024-08-02 ENCOUNTER — APPOINTMENT (OUTPATIENT)
Dept: PHYSICAL THERAPY | Age: 54
End: 2024-08-02
Attending: PHYSICIAN ASSISTANT
Payer: COMMERCIAL

## 2024-08-02 ENCOUNTER — TELEPHONE (OUTPATIENT)
Dept: PHYSICAL THERAPY | Facility: HOSPITAL | Age: 54
End: 2024-08-02

## 2024-08-20 ENCOUNTER — OFFICE VISIT (OUTPATIENT)
Dept: PHYSICAL THERAPY | Age: 54
End: 2024-08-20
Attending: PHYSICIAN ASSISTANT
Payer: COMMERCIAL

## 2024-08-20 PROCEDURE — 97110 THERAPEUTIC EXERCISES: CPT

## 2024-08-20 NOTE — PROGRESS NOTES
Progress Summary  Pt has attended 7 visits in Physical Therapy.     Diagnosis:   - Spinal stenosis of lumbar region with neurogenic claudication (M48.062)     - LBP with R lumbar radiculopathy    Moderate stenosis, s/p LESI 6/3/24 to moderate initial relief with only mild sustained relief       Referring Provider: Juanito Pringle  Date of Evaluation:    7/9/2024    Precautions:  None Next MD visit:   none scheduled  Date of Surgery: n/a   Insurance Primary/Secondary: [employer]IG[employer] / N/A     # Auth Visits: no auth           MRI RESULTS (5/7/24)  CONCLUSION:       1. Multilevel degenerative changes lumbar spine as above.  Developmental narrowing of the lumbar spinal canal along with epidural lipomatosis contributes to multilevel spinal canal stenosis.      2. Mild spinal canal stenosis at L2-3.  Mild to moderate spinal canal stenosis at L3-4 and L4-5.      3. Mild bilateral neural foraminal stenosis at L3-4 and L4-5.      4. Facet arthropathy throughout the lumbar spine, most pronounced at L4-5.      Subjective: pt arrives to PT today and states he is having good days and bad days.  Some days w/ shooting pain down leg do to HS and calf in both legs.  States this weekend was worse than normal.  States he has been doing HS stretch w/ strap.      Pain:   5/10      Objective:     Neuro Screen: Intermittent pain into BLE    LUMBAR AROM: (* denotes performed with pain)  Flexion: 80%* w/ B HS tightness and mild \"twinge\" of central LBP upon return to standing - (same to IE)  Extension: 75%   Sidebending: R 100%; L 100%    Palpation: TTP R lower lumbar paraspinals just above PSIS    Strength: (* denotes performed with pain)  LE   Hip flexion (L2): R 5/5; L 5/5  Hip abduction: R 4/5; L 4/5  Knee extension (L3): R 5/5; L 5/5   Knee Flexion: R 5/5; L 5/5   DF (L4): R 5/5; L 5/5  PF (S1): R 5/5; L 5/5  Great Toe Ext (L5): R 5/5, L 5/5    TA: 3+/5     Flexibility:   LE   Hip Flexor: R NT, L NT  Hamstrings: R tight; L tight  Piriformis:  R tight; L tight  Quads: R NT; L NT  Gastroc-soleus: R NT; L NT     Special tests:     RFIS: worse   GORGE: good    Slump: (-) bilaterally for LBP, neural tension noteable however bilaterally  SLR: L=50, R=56    PPU - elbows: better     Balance: SLS R mild/moderate instability, L mild/moderate instability      Assessment:   Freddy is a pleasant 55 y/o  male who presents to 7th physical therapy session for re-evaluation with primary c/o low back pain that radiates into both legs into hamstrings and sometimes calf - originally at eval pt reported pain only in his R leg down to his calf intermittently.  Patient had been progressing well thru Shukri based extension program but then started regressing slowly.  That said, pt was instructed to hold static stretching of hamstring d/t it mimicing a repeated extended hold for a Straight Leg Raise test for neural tension.  Compared to initial eval patient demonstrates some improvement in lumbar ROM and core strength.  That said, he continues to demonstrate impaired lumbar ROM, point tenderness, impaired core strength, impaired flexibility, (+) neural tension, radicular s/s into B LE, decreased function.  Functional deficits include but are not limited to pain and or difficulty w/ prolonged sitting, w/ yardwork, prolonged standing, golfing, is cautious w/ forward bending.  Pt and PT discussed evaluation findings, pathology, POC and HEP.  Pt voiced understanding and performs HEP correctly without reported pain. Holding PT at this time.  Recommend patient follow up with physician then follow up with PT after following up with physician for PT re-check.         Goals:   (to be met in 12 visits)   Pt will improve transversus abdominis recruitment to perform proper isometric contraction without requiring verbal or tactile cuing to promote advancement of therex - some progress   Pt will demonstrate good understanding of proper posture and body mechanics to decrease pain and improve  spinal safety - ongoing  Pt will improve lumbar spine AROM flexion to 100% to allow increase ease with bending forward to don shoes - no change  Pt will report improved symptom centralization and absence of radicular symptoms for 3 consecutive days to improve function with ADL - no change   Pt will have decreased paraspinal mm tension to tolerate standing >90 minutes for work and home activities - no change  Pt will demonstrate improved core strength to be able to perform lift and carry with <1/10 pain - ongoing  Pt will be independent and compliant with comprehensive HEP to maintain progress achieved in PT - ongoing  Pt will demo 4+/5 TA strength - ongoing    Plan: Holding PT at this time.  Patient to make follow up appointment with physician for further assessment/exam.  Patient then to make follow up with PT for re-check once he has seen physician.     Date:   7/16/24              TX#: 3/12 Date: 7/19/24                 TX#: 4/12 Date:  7/23/24               TX#: 5/12 Date: 7/30/24  Tx#: 6/12 Date: 8/20/24  Tx#: 7/12  Re-assess and hold PT   Upright bike - L5 - S4 - 5'   Shukri pillow demo  PPU - elbows w/ exhale x10  PPU - hands w/ exhale x10  R/L mod piri stretch 3x15\"  PPTs w/ dark pilates ring pressdown in hooklying 2x10, 3\"  PPTs w/ dark pilates ring - R and L bias - x10 each 3\"  PPT w/ march 2x10  90/90 SS heel tap marches x5 Upright bike - L8 - S4 - 5'   PPU - hands w/ exhale x10  PPU - hands in R [place] x10  PPTs w/ dark pilates ring pressdown in hooklying 1x10, 3\"  B knee stops 5\" x10  R/L mod piri stretch 3x15\"  90/90 SS heel tap marches 2x5  RTB B ext w/ feet close and TA brace x20  Bear crawl isometric hold 5\" x5  LTRs x10  Cat/camel x10  PPU - hands w/ exhale x10 Upright bike - L8 - S4 - 5'   PPU - hands w/ exhale x10  PPU - hands in R [place] x10  GORGE - x10  B knee stops 5\" x10  Deadbugs - contralat UE/LE 2x5  Seated sciatic R/L LAQ nn glides x20 Upright bike - L8 - S4 - 5.5'   PPU - hands w/  exhale x5  GORGE - x10  Prone lying on wedge - 3' x2  R/L mod piri stretch 3x15\"  Reclined seated sciatic R/L LAQ nn glides x20   B knee stops 5\" x10   Upright bike - L8 - S4 - 5.5'   R/L piriformis stretch x3  Pt education to hold strap HS stretch d/t mimic of SLR test  Re-assessment: objective tests and measures  PPU - elbows x10  R/L seated LAQ nn glide x15  HEP generation and review          Manual: 15'  - STM: lumbar PVM  - Joint mobs: lumbar Pas G2-3 Manual: 10'  - STM: lumbar PVM  - Joint mobs: lumbar Pas G2-4 Manual: 15'  - STM: lumbar PVM  - Joint mobs: lumbar Pas G2-4  - Hypervolt IASTM - B upper glutes Manual: 10'  - STM: lumbar PVM  - Joint mobs: lumbar Pas G2-4    HEP:   Access Code: [email or web address]  URL: [email or web address]  Date: 08/20/2024  Prepared by: Dave Gusman    Exercises  - Lying Prone  - 2 x daily - 7 x weekly - 3-5 min hold  - Prone Press Up On Elbows  - 2 x daily - 7 x weekly - 2 sets - 10 reps - 1-2 sec hold  - Prone Press Up  - 2 x daily - 7 x weekly - 2 sets - 10 reps - 1-2 hold  - Standing Lumbar Extension with Counter  - 2 x daily - 7 x weekly - 2 sets - 10 reps  - Supine Piriformis Stretch with Foot on Ground  - 2 x daily - 7 x weekly - 3-4 reps - 15-20 sec hold  - Supine Transversus Abdominis Bracing - Hands on Thighs  - 2 x daily - 7 x weekly - 2 sets - 10 reps  - Seated Long Arc Quad  - 2 x daily - 7 x weekly - 2 sets - 10 reps    With exhale progressions as well    Charges: 3 TE       Total Timed Treatment: 40 min  Total Treatment Time: 40 min    Patient/Family/Caregiver was advised of these findings, precautions, and treatment options and has agreed to actively participate in planning and for this course of care.    Thank you for your referral. If you have any questions, please contact me at Dept: [phone].    Sincerely,  Electronically signed by therapist: Dave Gusman, PT     Physician's certification required:  Yes  Please co-sign or sign and return this  letter via fax as soon as possible to [phone].   I certify the need for these services furnished under this plan of treatment and while under my care.    X___________________________________________________ Date____________________    Certification From: 8/20/2024  To:11/18/2024

## 2025-05-19 ENCOUNTER — OFFICE VISIT (OUTPATIENT)
Dept: INTERNAL MEDICINE CLINIC | Facility: CLINIC | Age: 55
End: 2025-05-19
Payer: COMMERCIAL

## 2025-05-19 ENCOUNTER — LAB ENCOUNTER (OUTPATIENT)
Dept: LAB | Age: 55
End: 2025-05-19
Attending: INTERNAL MEDICINE
Payer: COMMERCIAL

## 2025-05-19 VITALS
BODY MASS INDEX: 37.98 KG/M2 | HEART RATE: 75 BPM | WEIGHT: 242 LBS | HEIGHT: 67 IN | OXYGEN SATURATION: 99 % | RESPIRATION RATE: 16 BRPM | TEMPERATURE: 98 F | DIASTOLIC BLOOD PRESSURE: 84 MMHG | SYSTOLIC BLOOD PRESSURE: 128 MMHG

## 2025-05-19 DIAGNOSIS — Z23 NEED FOR PNEUMOCOCCAL VACCINATION: ICD-10-CM

## 2025-05-19 DIAGNOSIS — K04.7 TOOTH INFECTION: ICD-10-CM

## 2025-05-19 DIAGNOSIS — E66.09 CLASS 2 OBESITY DUE TO EXCESS CALORIES WITHOUT SERIOUS COMORBIDITY WITH BODY MASS INDEX (BMI) OF 37.0 TO 37.9 IN ADULT: ICD-10-CM

## 2025-05-19 DIAGNOSIS — R06.83 SNORING: ICD-10-CM

## 2025-05-19 DIAGNOSIS — Z12.5 PROSTATE CANCER SCREENING: ICD-10-CM

## 2025-05-19 DIAGNOSIS — Z00.00 ANNUAL PHYSICAL EXAM: Primary | ICD-10-CM

## 2025-05-19 DIAGNOSIS — E66.812 CLASS 2 OBESITY DUE TO EXCESS CALORIES WITHOUT SERIOUS COMORBIDITY WITH BODY MASS INDEX (BMI) OF 37.0 TO 37.9 IN ADULT: ICD-10-CM

## 2025-05-19 DIAGNOSIS — Z00.00 ROUTINE GENERAL MEDICAL EXAMINATION AT A HEALTH CARE FACILITY: ICD-10-CM

## 2025-05-19 DIAGNOSIS — B35.1 TOENAIL FUNGUS: ICD-10-CM

## 2025-05-19 PROBLEM — R79.89 ELEVATED LFTS: Status: ACTIVE | Noted: 2025-05-19

## 2025-05-19 LAB
ALBUMIN SERPL-MCNC: 4.4 G/DL (ref 3.2–4.8)
ALBUMIN/GLOB SERPL: 1.8 {RATIO} (ref 1–2)
ALP LIVER SERPL-CCNC: 61 U/L (ref 45–117)
ALT SERPL-CCNC: 58 U/L (ref 10–49)
ANION GAP SERPL CALC-SCNC: 9 MMOL/L (ref 0–18)
AST SERPL-CCNC: 34 U/L (ref ?–34)
BASOPHILS # BLD AUTO: 0.03 X10(3) UL (ref 0–0.2)
BASOPHILS NFR BLD AUTO: 0.7 %
BILIRUB SERPL-MCNC: 0.7 MG/DL (ref 0.3–1.2)
BILIRUB UR QL STRIP.AUTO: NEGATIVE
BUN BLD-MCNC: 13 MG/DL (ref 9–23)
CALCIUM BLD-MCNC: 9.5 MG/DL (ref 8.7–10.6)
CHLORIDE SERPL-SCNC: 110 MMOL/L (ref 98–112)
CHOLEST SERPL-MCNC: 178 MG/DL (ref ?–200)
CLARITY UR REFRACT.AUTO: CLEAR
CO2 SERPL-SCNC: 24 MMOL/L (ref 21–32)
COLOR UR AUTO: YELLOW
COMPLEXED PSA SERPL-MCNC: 0.92 NG/ML (ref ?–4)
CREAT BLD-MCNC: 1 MG/DL (ref 0.7–1.3)
EGFRCR SERPLBLD CKD-EPI 2021: 89 ML/MIN/1.73M2 (ref 60–?)
EOSINOPHIL # BLD AUTO: 0.19 X10(3) UL (ref 0–0.7)
EOSINOPHIL NFR BLD AUTO: 4.2 %
ERYTHROCYTE [DISTWIDTH] IN BLOOD BY AUTOMATED COUNT: 12.6 %
FASTING PATIENT LIPID ANSWER: YES
FASTING STATUS PATIENT QL REPORTED: YES
GLOBULIN PLAS-MCNC: 2.4 G/DL (ref 2–3.5)
GLUCOSE BLD-MCNC: 83 MG/DL (ref 70–99)
GLUCOSE UR STRIP.AUTO-MCNC: NORMAL MG/DL
HCT VFR BLD AUTO: 49.2 % (ref 39–53)
HDLC SERPL-MCNC: 44 MG/DL (ref 40–59)
HGB BLD-MCNC: 16.8 G/DL (ref 13–17.5)
IMM GRANULOCYTES # BLD AUTO: 0.02 X10(3) UL (ref 0–1)
IMM GRANULOCYTES NFR BLD: 0.4 %
KETONES UR STRIP.AUTO-MCNC: NEGATIVE MG/DL
LDLC SERPL CALC-MCNC: 105 MG/DL (ref ?–100)
LEUKOCYTE ESTERASE UR QL STRIP.AUTO: NEGATIVE
LYMPHOCYTES # BLD AUTO: 1.88 X10(3) UL (ref 1–4)
LYMPHOCYTES NFR BLD AUTO: 41.9 %
MCH RBC QN AUTO: 32.3 PG (ref 26–34)
MCHC RBC AUTO-ENTMCNC: 34.1 G/DL (ref 31–37)
MCV RBC AUTO: 94.6 FL (ref 80–100)
MONOCYTES # BLD AUTO: 0.33 X10(3) UL (ref 0.1–1)
MONOCYTES NFR BLD AUTO: 7.3 %
NEUTROPHILS # BLD AUTO: 2.04 X10 (3) UL (ref 1.5–7.7)
NEUTROPHILS # BLD AUTO: 2.04 X10(3) UL (ref 1.5–7.7)
NEUTROPHILS NFR BLD AUTO: 45.5 %
NITRITE UR QL STRIP.AUTO: NEGATIVE
NONHDLC SERPL-MCNC: 134 MG/DL (ref ?–130)
OSMOLALITY SERPL CALC.SUM OF ELEC: 295 MOSM/KG (ref 275–295)
PH UR STRIP.AUTO: 5 [PH] (ref 5–8)
PLATELET # BLD AUTO: 194 10(3)UL (ref 150–450)
POTASSIUM SERPL-SCNC: 4.2 MMOL/L (ref 3.5–5.1)
PROT SERPL-MCNC: 6.8 G/DL (ref 5.7–8.2)
PROT UR STRIP.AUTO-MCNC: NEGATIVE MG/DL
RBC # BLD AUTO: 5.2 X10(6)UL (ref 4.3–5.7)
RBC UR QL AUTO: NEGATIVE
SODIUM SERPL-SCNC: 143 MMOL/L (ref 136–145)
SP GR UR STRIP.AUTO: 1.02 (ref 1–1.03)
TRIGL SERPL-MCNC: 168 MG/DL (ref 30–149)
TSI SER-ACNC: 1.9 UIU/ML (ref 0.55–4.78)
UROBILINOGEN UR STRIP.AUTO-MCNC: NORMAL MG/DL
VLDLC SERPL CALC-MCNC: 28 MG/DL (ref 0–30)
WBC # BLD AUTO: 4.5 X10(3) UL (ref 4–11)

## 2025-05-19 PROCEDURE — 80053 COMPREHEN METABOLIC PANEL: CPT

## 2025-05-19 PROCEDURE — 80061 LIPID PANEL: CPT

## 2025-05-19 PROCEDURE — 36415 COLL VENOUS BLD VENIPUNCTURE: CPT

## 2025-05-19 PROCEDURE — 84443 ASSAY THYROID STIM HORMONE: CPT

## 2025-05-19 PROCEDURE — 81003 URINALYSIS AUTO W/O SCOPE: CPT

## 2025-05-19 PROCEDURE — 85025 COMPLETE CBC W/AUTO DIFF WBC: CPT

## 2025-05-19 RX ORDER — CEPHALEXIN 500 MG/1
500 CAPSULE ORAL 3 TIMES DAILY
Qty: 21 CAPSULE | Refills: 0 | Status: SHIPPED | OUTPATIENT
Start: 2025-05-19

## 2025-05-19 RX ORDER — CICLOPIROX 80 MG/ML
1 SOLUTION TOPICAL NIGHTLY
Qty: 1 EACH | Refills: 1 | Status: SHIPPED | OUTPATIENT
Start: 2025-05-19

## 2025-05-19 NOTE — PROGRESS NOTES
Subjective:   Patient ID: Freddy Ontiveros is a 55 year old male.    HPI  Freddy Ontiveros is a 55 year old male who presents for a complete physical exam.   HPI:   Pt reports feeling in his normal state of health  He reports excessive snoring . Never completes sleep study. Snoring and fatigue is worsening    Wants to start semiglutide for wt loss    PAST MEDICAL, SOCIAL, FAMILY HISTORIES REVIEWED WITH PT  .    Immunization History   Administered Date(s) Administered    Covid-19 Vaccine Pfizer 30 mcg/0.3 ml 05/09/2021, 05/30/2021, 01/03/2022    TDAP 01/05/2023     Wt Readings from Last 6 Encounters:   04/02/24 230 lb (104.3 kg)   04/05/23 220 lb (99.8 kg)   02/14/23 233 lb (105.7 kg)   01/05/23 233 lb (105.7 kg)     There is no height or weight on file to calculate BMI.     Lab Results   Component Value Date    GLU 98 04/02/2024    GLU 85 01/06/2023    GLU 74 01/09/2014     Lab Results   Component Value Date    CHOLEST 204 (H) 04/02/2024    CHOLEST 174 01/06/2023    CHOLEST 180 01/09/2014     Lab Results   Component Value Date    HDL 46 04/02/2024    HDL 41 01/06/2023    HDL 40 (L) 01/09/2014     Lab Results   Component Value Date     (H) 04/02/2024     (H) 01/06/2023     01/09/2014     Lab Results   Component Value Date    AST 31 04/02/2024    AST 32 01/06/2023    AST 22 01/09/2014     Lab Results   Component Value Date    ALT 61 04/02/2024    ALT 63 (H) 01/06/2023    ALT 42 01/09/2014     Lab Results   Component Value Date    PSA 0.472 01/09/2014        Current Medications[1]   Past Medical History[2]   Past Surgical History[3]   Family History[4]   Social History:  Short Social Hx on File[5]   Occ: yes. : yes Children: yes.   Exercise: once per week,  twice per week.  Diet: watches fats closely and watches sugar closely     REVIEW OF SYSTEMS:   GENERAL: feels well otherwise  A comprehensive 10 point review of systems was completed.     Pertinent positives and negatives noted in the  HPI.      EXAM:   There were no vitals taken for this visit.  There is no height or weight on file to calculate BMI.   GENERAL: well developed, well nourished,in no apparent distress  SKIN: no rashes,no suspicious lesions  HEENT: atraumatic, normocephalic,ears and throat are clear  EYES:PERRLA, EOMI, ,conjunctiva are clear  NECK: supple,no adenopathy,no bruits  LUNGS: clear to auscultation  CARDIO: RRR without murmur  GI: good BS's,no masses, HSM or tenderness  : deferred  MUSCULOSKELETAL: back is not tender  EXTREMITIES: no cyanosis, clubbing or edema  NEURO: Oriented times three,m otor and sensory are grossly intact    ASSESSMENT AND PLAN:   Freddy Ontiveros is a 55 year old male who presents for a complete physical exam.  There is no height or weight on file to calculate BMI., recommended low fat diet and aerobic exercise 30 minutes three times weekly.     Health maintenance, will check fasting Lipids, CMP, CBC and PSA.    Snoring and apnea- refer for sleep study  Obesity- start wegovy. Recheck in 4 weeks    UTD with screening colonoscopy.     Pt info handouts given for: exercise, low fat diet,      The patient indicates understanding of these issues and agrees to the plan.  The patient is asked to return for 4 weeks    History/Other:   Review of Systems  Current Medications[6]  Allergies:Allergies[7]    Objective:   Physical Exam    Assessment & Plan:   1. Annual physical exam    2. Routine general medical examination at a health care facility    3. Prostate cancer screening        No orders of the defined types were placed in this encounter.      Meds This Visit:  Requested Prescriptions      No prescriptions requested or ordered in this encounter       Imaging & Referrals:  None         [1]   Current Outpatient Medications   Medication Sig Dispense Refill    Ciclopirox (PENLAC) 8 % External Solution Apply 1 Application. topically nightly. 1 each 1   [2]   Past Medical History:   Arthritis    Wears glasses    [3]   Past Surgical History:  Procedure Laterality Date    Appendectomy      Tonsillectomy     [4]   Family History  Problem Relation Age of Onset    Colon Polyps Father     Prostate Cancer Father     Diabetes Brother    [5]   Social History  Socioeconomic History    Marital status:    Tobacco Use    Smoking status: Never    Smokeless tobacco: Former     Quit date: 2021   Vaping Use    Vaping status: Never Used   Substance and Sexual Activity    Alcohol use: Yes     Alcohol/week: 10.0 standard drinks of alcohol     Types: 2 Glasses of wine, 4 Cans of beer, 4 Standard drinks or equivalent per week    Drug use: Not Currently   Other Topics Concern    Caffeine Concern Yes    Exercise No   [6]   Current Outpatient Medications   Medication Sig Dispense Refill    Ciclopirox (PENLAC) 8 % External Solution Apply 1 Application. topically nightly. 1 each 1   [7]   Allergies  Allergen Reactions    Seasonal ITCHING

## 2025-05-20 NOTE — PROGRESS NOTES
Written by Riki Bourgeois MD on 5/19/2025  9:59 PM CDT  Seen by patient Freddy Ontiveros on 5/20/2025 11:57 AM

## 2025-06-03 ENCOUNTER — PATIENT MESSAGE (OUTPATIENT)
Dept: INTERNAL MEDICINE CLINIC | Facility: CLINIC | Age: 55
End: 2025-06-03

## 2025-06-03 NOTE — TELEPHONE ENCOUNTER
Closed    Close reason: Other  Payer: [employer]    [phone]  Note from payer: Drug is not covered by plan - Prescriber details have been updated to match the prescriber directory.

## (undated) DEVICE — SYRINGE 10ML SLIP TIP LOSS OF RESIST PLAS

## (undated) DEVICE — AVANOS* TUOHY EPIDURAL NEEDLE: Brand: AVANOS

## (undated) DEVICE — GLOVE SUR 6.5 SENSICARE PIP WHT PWD F

## (undated) DEVICE — GLOVE SUR 7.5 SENSICARE PIP WHT PWD F

## (undated) DEVICE — PAIN TRAY: Brand: MEDLINE INDUSTRIES, INC.

## (undated) DEVICE — BANDAGE ADH 1INX3IN NAT FAB N ADH PD CURAD

## (undated) DEVICE — REMOVER LOT 4OZ N IRRIG UNSCNT SFT MOIST LIQ

## (undated) DEVICE — SKIN REG/FINE DUAL MARKER, RULER, LABELS: Brand: MEDLINE